# Patient Record
Sex: FEMALE | Race: WHITE | NOT HISPANIC OR LATINO | Employment: PART TIME | URBAN - METROPOLITAN AREA
[De-identification: names, ages, dates, MRNs, and addresses within clinical notes are randomized per-mention and may not be internally consistent; named-entity substitution may affect disease eponyms.]

---

## 2023-05-22 ENCOUNTER — HOSPITAL ENCOUNTER (OUTPATIENT)
Dept: DATA CONVERSION | Facility: HOSPITAL | Age: 33
End: 2023-05-22
Attending: INTERNAL MEDICINE | Admitting: INTERNAL MEDICINE
Payer: COMMERCIAL

## 2023-05-22 DIAGNOSIS — K44.9 DIAPHRAGMATIC HERNIA WITHOUT OBSTRUCTION OR GANGRENE: ICD-10-CM

## 2023-05-22 DIAGNOSIS — D50.9 IRON DEFICIENCY ANEMIA, UNSPECIFIED: ICD-10-CM

## 2023-05-22 DIAGNOSIS — N80.9 ENDOMETRIOSIS, UNSPECIFIED: ICD-10-CM

## 2023-05-22 DIAGNOSIS — F41.9 ANXIETY DISORDER, UNSPECIFIED: ICD-10-CM

## 2023-05-22 DIAGNOSIS — K63.89 OTHER SPECIFIED DISEASES OF INTESTINE: ICD-10-CM

## 2023-05-22 DIAGNOSIS — K64.8 OTHER HEMORRHOIDS: ICD-10-CM

## 2023-05-22 DIAGNOSIS — D12.2 BENIGN NEOPLASM OF ASCENDING COLON: ICD-10-CM

## 2023-05-22 DIAGNOSIS — K31.89 OTHER DISEASES OF STOMACH AND DUODENUM: ICD-10-CM

## 2023-05-22 DIAGNOSIS — K22.2 ESOPHAGEAL OBSTRUCTION: ICD-10-CM

## 2023-05-22 DIAGNOSIS — R19.5 OTHER FECAL ABNORMALITIES: ICD-10-CM

## 2023-05-22 DIAGNOSIS — K59.01 SLOW TRANSIT CONSTIPATION: ICD-10-CM

## 2023-05-31 LAB
COMPLETE PATHOLOGY REPORT: NORMAL
CONVERTED CLINICAL DIAGNOSIS-HISTORY: NORMAL
CONVERTED FINAL DIAGNOSIS: NORMAL
CONVERTED FINAL REPORT PDF LINK TO COPY AND PASTE: NORMAL
CONVERTED GROSS DESCRIPTION: NORMAL

## 2023-11-10 PROBLEM — F41.9 ANXIETY: Status: ACTIVE | Noted: 2023-11-10

## 2023-11-10 PROBLEM — M79.10 MYALGIA: Status: ACTIVE | Noted: 2023-11-10

## 2023-11-10 PROBLEM — Z86.59 HISTORY OF LAXATIVE ABUSE: Status: ACTIVE | Noted: 2023-11-10

## 2023-11-10 PROBLEM — H52.13 BILATERAL MYOPIA: Status: ACTIVE | Noted: 2023-11-10

## 2023-11-10 PROBLEM — N61.0 MASTITIS: Status: ACTIVE | Noted: 2023-11-10

## 2023-11-10 PROBLEM — B34.9 VIRAL SYNDROME: Status: ACTIVE | Noted: 2023-11-10

## 2023-11-10 PROBLEM — N63.20 LEFT BREAST MASS: Status: ACTIVE | Noted: 2023-11-10

## 2023-11-10 PROBLEM — K56.7 ILEUS (MULTI): Status: ACTIVE | Noted: 2023-11-10

## 2023-11-10 PROBLEM — J34.3 HYPERTROPHY OF BOTH INFERIOR NASAL TURBINATES: Status: ACTIVE | Noted: 2023-11-10

## 2023-11-10 PROBLEM — R10.9 ABDOMINAL PAIN: Status: ACTIVE | Noted: 2023-11-10

## 2023-11-10 PROBLEM — D50.9 ANEMIA, IRON DEFICIENCY: Status: ACTIVE | Noted: 2023-11-10

## 2023-11-10 PROBLEM — N80.9 ENDOMETRIOSIS: Status: ACTIVE | Noted: 2023-11-10

## 2023-11-10 PROBLEM — D17.1 LIPOMA OF TORSO: Status: ACTIVE | Noted: 2023-11-10

## 2023-11-10 PROBLEM — H92.09 OTALGIA: Status: ACTIVE | Noted: 2023-11-10

## 2023-11-10 PROBLEM — H66.92 LEFT OTITIS MEDIA: Status: ACTIVE | Noted: 2023-11-10

## 2023-11-10 PROBLEM — K62.5 RECTAL BLEEDING: Status: ACTIVE | Noted: 2023-11-10

## 2023-11-10 PROBLEM — A49.8: Status: ACTIVE | Noted: 2023-11-10

## 2023-11-10 PROBLEM — H69.93 DYSFUNCTION OF BOTH EUSTACHIAN TUBES: Status: ACTIVE | Noted: 2023-11-10

## 2023-11-10 PROBLEM — K56.609 BOWEL OBSTRUCTION (MULTI): Status: ACTIVE | Noted: 2023-11-10

## 2023-11-10 PROBLEM — O21.0 HYPEREMESIS GRAVIDARUM (HHS-HCC): Status: ACTIVE | Noted: 2023-11-10

## 2023-11-10 RX ORDER — ONDANSETRON 4 MG/1
TABLET, ORALLY DISINTEGRATING ORAL
COMMUNITY
End: 2023-11-14 | Stop reason: WASHOUT

## 2023-11-10 RX ORDER — DEXTROAMPHETAMINE SACCHARATE, AMPHETAMINE ASPARTATE, DEXTROAMPHETAMINE SULFATE AND AMPHETAMINE SULFATE 2.5; 2.5; 2.5; 2.5 MG/1; MG/1; MG/1; MG/1
1 TABLET ORAL 2 TIMES DAILY
COMMUNITY
Start: 2023-05-03 | End: 2023-11-14 | Stop reason: WASHOUT

## 2023-11-10 RX ORDER — DEXTROAMPHETAMINE SACCHARATE, AMPHETAMINE ASPARTATE, DEXTROAMPHETAMINE SULFATE AND AMPHETAMINE SULFATE 1.25; 1.25; 1.25; 1.25 MG/1; MG/1; MG/1; MG/1
2 TABLET ORAL 2 TIMES DAILY
COMMUNITY
Start: 2023-03-07 | End: 2023-11-14 | Stop reason: WASHOUT

## 2023-11-10 RX ORDER — LINACLOTIDE 290 UG/1
290 CAPSULE, GELATIN COATED ORAL
COMMUNITY
Start: 2023-05-31

## 2023-11-10 RX ORDER — LEVONORGESTREL 52 MG/1
1 INTRAUTERINE DEVICE INTRAUTERINE
COMMUNITY
Start: 2023-05-17

## 2023-11-10 RX ORDER — DEXTROAMPHETAMINE SULFATE, DEXTROAMPHETAMINE SACCHARATE, AMPHETAMINE SULFATE AND AMPHETAMINE ASPARTATE 2.5; 2.5; 2.5; 2.5 MG/1; MG/1; MG/1; MG/1
CAPSULE, EXTENDED RELEASE ORAL
COMMUNITY
Start: 2022-12-18 | End: 2023-11-14 | Stop reason: WASHOUT

## 2023-11-10 RX ORDER — SERTRALINE HYDROCHLORIDE 25 MG/1
TABLET, FILM COATED ORAL
COMMUNITY

## 2023-11-10 RX ORDER — FOLIC ACID 1 MG/1
1 TABLET ORAL DAILY
COMMUNITY
Start: 2023-05-11

## 2023-11-10 RX ORDER — BUPROPION HYDROCHLORIDE 150 MG/1
TABLET ORAL
COMMUNITY
Start: 2023-09-25

## 2023-11-10 RX ORDER — AZELASTINE 1 MG/ML
1 SPRAY, METERED NASAL 2 TIMES DAILY
COMMUNITY
Start: 2022-12-19 | End: 2023-11-14 | Stop reason: WASHOUT

## 2023-11-10 RX ORDER — SERTRALINE HYDROCHLORIDE 50 MG/1
50 TABLET, FILM COATED ORAL DAILY
COMMUNITY
Start: 2023-05-18 | End: 2023-11-14 | Stop reason: WASHOUT

## 2023-11-10 RX ORDER — DEXTROAMPHETAMINE SACCHARATE, AMPHETAMINE ASPARTATE MONOHYDRATE, DEXTROAMPHETAMINE SULFATE AND AMPHETAMINE SULFATE 5; 5; 5; 5 MG/1; MG/1; MG/1; MG/1
20 CAPSULE, EXTENDED RELEASE ORAL DAILY
COMMUNITY
Start: 2023-05-11

## 2023-11-10 RX ORDER — ESCITALOPRAM OXALATE 10 MG/1
TABLET ORAL
COMMUNITY
End: 2023-11-14 | Stop reason: WASHOUT

## 2023-11-10 RX ORDER — SENNOSIDES 8.6 MG
2 TABLET ORAL NIGHTLY
COMMUNITY
Start: 2023-03-11

## 2023-11-10 RX ORDER — NORETHINDRONE 5 MG/1
5 TABLET ORAL DAILY
COMMUNITY
Start: 2023-04-25

## 2023-11-10 RX ORDER — POLYETHYLENE GLYCOL 3350 17 G/17G
POWDER, FOR SOLUTION ORAL
COMMUNITY
Start: 2023-03-11

## 2023-11-10 RX ORDER — LINACLOTIDE 145 UG/1
145 CAPSULE, GELATIN COATED ORAL DAILY
COMMUNITY
Start: 2023-03-11 | End: 2023-11-14 | Stop reason: WASHOUT

## 2023-11-13 ENCOUNTER — OFFICE VISIT (OUTPATIENT)
Dept: OTOLARYNGOLOGY | Facility: CLINIC | Age: 33
End: 2023-11-13
Payer: COMMERCIAL

## 2023-11-13 VITALS
SYSTOLIC BLOOD PRESSURE: 118 MMHG | BODY MASS INDEX: 18.05 KG/M2 | TEMPERATURE: 97.9 F | WEIGHT: 115 LBS | DIASTOLIC BLOOD PRESSURE: 79 MMHG | HEART RATE: 88 BPM | HEIGHT: 67 IN

## 2023-11-13 DIAGNOSIS — J32.9 CHRONIC RHINOSINUSITIS: Primary | ICD-10-CM

## 2023-11-13 DIAGNOSIS — J31.2 CHRONIC PHARYNGITIS: ICD-10-CM

## 2023-11-13 PROCEDURE — 99203 OFFICE O/P NEW LOW 30 MIN: CPT

## 2023-11-13 PROCEDURE — 1036F TOBACCO NON-USER: CPT

## 2023-11-13 PROCEDURE — 31231 NASAL ENDOSCOPY DX: CPT

## 2023-11-13 RX ORDER — DOXYCYCLINE 100 MG/1
100 CAPSULE ORAL 2 TIMES DAILY
Qty: 42 CAPSULE | Refills: 0 | Status: SHIPPED | OUTPATIENT
Start: 2023-11-13 | End: 2023-12-04

## 2023-11-13 NOTE — PROGRESS NOTES
DIAGNOSES/PROBLEMS:  -Chronic rhinosinusitis without nasal polyposis  -Chronic pharyngitis  PROVIDER IMPRESSIONS:  ASSESSMENT: Pat Dorado  is a pleasant 33 y.o. female who presents with symptoms of  symptoms of chronic nasal congestion and throat discomfort and clinical findings consistent with chronic rhinosinusitis without nasal polyposis. Nasal endoscopy today revealed evidence of purulent drainage located in the left middle meatus and/or sphenoethmoid recess. Patient reassured that exam findings did not show polyps, masses or lesions. I discussed with the patient that given her history of recurrent sinus infections, further investigation with imaging of the sinuses is necessary to determine etiology. I also explained that we will provide the patient with maximum medical therapy for the sinuses prior to sinonasal imaging.     PLAN:  We will start the patient on antibiotics to complete maximal medical therapy. Rx for doxycyline 100 mg p.o. twice daily for 21 days. The patient was advised to take sun precautions while on medication and to notify us if they experience abdominal bloating and diarrhea, and we will change medication.  I recommended Medrol Dosepak for inflammation of the nasopharynx. Prescription e-submitted to pharmacy on 11/16/2023.   After completing a course of oral antibiotics, please obtain a CT scan for the sinuses. Requisition order provided to the patient.  I recommended initiating saline nasal irrigation once a day prior to application of intranasal steroid. Patient was given a demonstration on proper application and provided instructional handout.   I recommended continuation of intranasal steroid nasal spray to help improve nasal symptoms with Flonase 1 spray each nostril twice daily. Patient was also instructed on the appropriate use of the medication, by pointing the applicator tip towards the lateral wall of the nose/corner of the eye on the same side. Patient was instructed to avoid  the septum due to the risk of nasal bleeding. Reinforced education to clean applicator after every use.   Follow-up: Patient may schedule virtual follow up visit with me after CT imaging of the sinus to review the results. Patient is amendable to plan. All questions answered to patient satisfaction.     Subjective   Patient ID Pat Dorado is a 33 y.o. female who presents for initial evaluation of recurrent sinus infections.     History of Present Illness  Mariluz is a 33 year old female who presents for subsequent evaluation of recurrent sinus infections.The patient recalls initial onset approximately 6 years ago (since her last pregnancy), but endorses that sinus infections have recurred more frequently over the past 3 years. She states that over the past 12 months, she has been treated for approximately 10 sinus infections, with approximately 4-6 infections treated in the past 4 months. Sinus infections typically associated with a low-grade fever (Tmax reported to be 101 F), ear popping, ear pain and tinnitus. When asked about major sinonasal symptoms, including anterior nasal drainage, posterior nasal drainage, nasal airway obstruction, facial pain (right>left), facial pressure, and decreased or changed sense of smell,the patient admits to experiencing facial pain, facial pressure, and posterior nasal drainage. When asked about associated symptoms, including headache, throat clearing, coughing, throat discomfort, hoarseness, sneezing, itching eyes, and nasal bleeding, she admits to experiencing throat clearing and throat discomfort. On 10/24/2023 she was treated for a sinus infection with a 10 day course of p.o. Augmentin twice daily. On 11/6/2023, she returned clinic due to ongoing symptoms and was treated with clindamycin 300 mg TID for 7 days and a Medrol Dosepak.  When asked about medications used for sinonasal symptoms, the patient reports current use of Flonase intranasal spray and zyrtec daily. Denies past  medical history of asthma, aspirin sensitivity, migraines, or nasal fracture. Denies history of sinonasal imaging or surgery.       Past Medical History:   Diagnosis Date    Anxiety disorder, unspecified     Anxiety    Infection of nipple associated with the puerperium 2019    Yeast infection of nipple, postpartum    Nonpurulent mastitis associated with the puerperium 2019    Mastitis, postpartum    Personal history of other complications of pregnancy, childbirth and the puerperium 2019    History of threatened     Personal history of other diseases of the female genital tract 2017    History of endometritis    Personal history of other mental and behavioral disorders 2017    History of depression      Past Surgical History:   Procedure Laterality Date    LAPAROSCOPY DIAGNOSTIC / BIOPSY / ASPIRATION / LYSIS  2017    Laparoscopy (Diagnostic)    OTHER SURGICAL HISTORY  2015    Appendiceal Laparoscopy      Allergies   Allergen Reactions    Cefaclor Angioedema, Other and Swelling        Current Outpatient Medications:     amphetamine-dextroamphetamine XR (Adderall XR) 20 mg 24 hr capsule, Take 1 capsule (20 mg) by mouth once daily., Disp: , Rfl:     buPROPion XL (Wellbutrin XL) 150 mg 24 hr tablet, Take 1 tablet by mouth every afternoon., Disp: , Rfl:     folic acid (Folvite) 1 mg tablet, Take 1 tablet (1 mg) by mouth once daily., Disp: , Rfl:     levonorgestrel (Mirena) 21 mcg/24 hours (8 yrs) 52 mg IUD, 52 mg by intrauterine route. one time only for 1 dose., Disp: , Rfl:     Linzess 290 mcg capsule, Take 1 capsule (290 mcg) by mouth once daily., Disp: , Rfl:     norethindrone (Aygestin) 5 mg tablet, Take 1 tablet (5 mg) by mouth once daily., Disp: , Rfl:     polyethylene glycol (Glycolax, Miralax) 17 gram/dose powder, MIX 17 GRAMS IN LIQUID AND DRINK TWICE DAILY, Disp: , Rfl:     senna 8.6 mg tablet, Take 2 tablets (17.2 mg) by mouth once daily at bedtime., Disp: ,  "Rfl:     sertraline (Zoloft) 25 mg tablet,  , Disp: , Rfl:     Adderall XR 10 mg 24 hr capsule, TAKE 2 CAPSULES BY MOUTH EVERY MORNING, Disp: , Rfl:     amphetamine-dextroamphetamine (Adderall) 10 mg tablet, Take 1 tablet (10 mg) by mouth 2 times a day., Disp: , Rfl:     amphetamine-dextroamphetamine (Adderall) 5 mg tablet, Take 2 tablets (10 mg) by mouth 2 times a day., Disp: , Rfl:     azelastine (Astelin) 137 mcg (0.1 %) nasal spray, Administer 1 spray into each nostril 2 times a day., Disp: , Rfl:     escitalopram (Lexapro) 10 mg tablet,  , Disp: , Rfl:     Linzess 145 mcg capsule, Take 1 capsule (145 mcg) by mouth once daily., Disp: , Rfl:     ondansetron ODT (Zofran-ODT) 4 mg disintegrating tablet, DISSOLVE 1 TABLET ON THE TONGUE EVERY 8 HOURS FOR UP TO 3 DAYS AS NEEDED FOR NAUSEA OR VOMITING, Disp: , Rfl:     sertraline (Zoloft) 50 mg tablet, Take 1 tablet (50 mg) by mouth once daily., Disp: , Rfl:    Tobacco Use: Low Risk  (11/13/2023)    Patient History     Smoking Tobacco Use: Never     Smokeless Tobacco Use: Never     Passive Exposure: Never      Alcohol Use: Not on file      Social History     Substance and Sexual Activity   Drug Use Not on file        Review of Systems   All other systems negative.     Objective   Visit Vitals  /79 (BP Location: Right arm, Patient Position: Sitting, BP Cuff Size: Adult)   Pulse 88   Temp 36.6 °C (97.9 °F)   Ht 1.702 m (5' 7\")   Wt 52.2 kg (115 lb)   BMI 18.01 kg/m²   Smoking Status Never   BSA 1.57 m²        Physical Exam  General appearance: Appears well, well-nourished, well groomed. No acute distress.   Constitutional: No fever, chills, weight loss or weight gain.  Communication: Normal communication  Psychiatric: Oriented to person, place and time. Normal mood and affect.  Neurologic: Cranial nerves II-XII grossly intact and symmetric bilaterally.  Cardiovascular: Examination of peripheral vascular system shows no clubbing or cyanosis.  Respiratory: No " respiratory distress increased work of breathing. Inspection of the chest with symmetric chest expansion and normal respiratory effort.  Skin: No head and neck rashes.  Head: Normocephalic. Atraumatic with no masses, lesions or scarring.  Face: Normal symmetry. No scars or deformities.  Eyes: Conjunctiva not edematous or erythematous. PERRLA  Neck: Supple and symmetric, trachea midline. Lymph nodes with no adenopathy.  Head: Normocephalic. Atraumatic with no masses, lesions or scarring.  Eyes: PERRL, EOMI, Conjunctiva is clear. No nystagmus.  Nose: External inspection of nose: No nasal lesions, lacerations or scars. Mild tenderness on frontal or maxillary sinus palpation.  Throat:  Floor of mouth is clear, no masses.  Tongue appears normal, no lesions or masses. Gums, gingiva, buccal mucosa appear pink and moist, no lesions. Teeth are intact .  No obvious dental infections.  Peritonsillar regions appear symmetric without swelling.  Hard and soft palate appear normal, no obvious cleft. Uvula is midline.  Left Tonsil -- 2+, no exudates.  Right Tonsil -- 2+, no exudates.  Oropharynx: No lesions. Retropharyngeal wall appears erythematous and with cobblestone appearance with postnasal drip.  Salivary Glands: Symmetric bilaterally.  No palpable masses.  No evidence of acute infection or salivary stones.  Neck: Supple,  no lymphadenopathy.  No masses.  Right Ear: External inspection of ear with no deformity, scars, or masses. Mastoid is nontender. External auditory canal is clear. TM is intact with no sign of infection, effusion, or retraction.  No perforation seen. Auto insufflation visible under microscopy.  Left Ear: External inspection of ear with no deformity, scars, or masses. Mastoid is nontender. External auditory canal clear.  Tympanic membrane is intact with no sign of infection, effusion, or retraction.  No perforation seen. Auto insufflation visible under microscopy.    Procedures   NASAL ENDOSCOPY PROCEDURE  NOTE  INDICATION: concern for rhinosinusitis  For better visualization, rigid nasal endoscopy was performed.  Verbal consent was obtained by the patient and/or guardian. Both nostrils were sprayed with a mixture of lidocaine 4% and topical nasal decongestant. After a sufficient amount of time elapsed for mucosal anesthesia to take place, the nasal endoscope was advanced into the nostril.     The following areas were visualized:  Nasal passage, nasal septum, turbinates, middle meatus, nasopharynx, sinus ostia     The patient tolerated the procedure well and these structures were found to be normal except as follows:  Septum midline, retropharyngeal wall with cobblestone appearance and erythematous. There is evidence of purulent drainage located in the left middle meatus and/or sphenoethmoid recess. No evidence of nasal polyps, lesions, or masses. ET orifices patent.     Kenisha Myrick, APRN-CNP

## 2023-11-14 NOTE — PATIENT INSTRUCTIONS
ANTIBIOTIC PRESCRIPTION INSTRUCTIONS: DOXYCYCLINE  You will be prescribed an antibiotic called Doxycycline, taken by mouth twice daily, for 3 full weeks.  Special instructions: Please take food with oral antibiotic (doxycycline) use. Do not take medication within 2 hours of consuming dairy products, as this may interact with the medication’s effectiveness. You may take probiotics while on antibiotics: you may use yogurt as probiotic if consumed 2 hours before or after taking doxycycline.  Adverse drug reactions: Doxycycline can cause a skin sensitivity reaction with the sun. Avoid sun exposure while taking this medication. Severe sunburn and skin damage may occur with sun exposure if no UV protection is used while on this medication. Discontinue antibiotic use if you experience abdominal pain, bloating and severe diarrhea. Call 911 or proceed to nearest Emergency Department if you experience hives and/or shortness of breath occur while taking doxycycline. Call the office and new medication will be prescribed.  It is essential for you to complete the full course of antibiotics for maximum effectiveness and decreasing risk of pathogen resistance. Even if you start to feel symptom improvement, please continue to take antibiotic entirely unless you are experiencing any adverse drug reactions.  Follow up with radiologic test such as CT or MRI if these were ordered.

## 2023-11-16 RX ORDER — METHYLPREDNISOLONE 4 MG/1
4 TABLET ORAL DAILY
Qty: 7 TABLET | Refills: 0 | Status: SHIPPED | OUTPATIENT
Start: 2023-11-16 | End: 2023-11-23

## 2023-12-04 ENCOUNTER — APPOINTMENT (OUTPATIENT)
Dept: RADIOLOGY | Facility: CLINIC | Age: 33
End: 2023-12-04
Payer: COMMERCIAL

## 2023-12-05 ENCOUNTER — TELEPHONE (OUTPATIENT)
Dept: OTOLARYNGOLOGY | Facility: CLINIC | Age: 33
End: 2023-12-05
Payer: COMMERCIAL

## 2023-12-05 NOTE — TELEPHONE ENCOUNTER
"Relayed below message from Bel Johnston NP     \"I just looked through her chart. She has had an extensive amount of antibiotic therapy and I really don't advise any further. She just completed 21 days of doxy from Jessica. Prior to that had 7 days of clinda and prior to that augmentin x 10 days.     Im happy to provide her with diflucan but she should continue supportive measures with saline irrigations and continue monitoring her temperature. She should be evaluated in urgent care for anything over 102. You can ask Jessica next week to take an early look at the CT if she calls back because it is persisting.     With that much oral antibiotic therapy, there is really no indication to continue, its clearly not working.     Please call her and walk her through saline irrigations. And ask if she wants the diflucan and confirm pharmacy. But I would hold off on further antibiotics. \"     Patient stated she has started taking some antibiotics ordered from her daughter and she is starting to feel better- instructed her on risks of taking long term antibiotic use. Patient will wait for the CT Scan to be read by Jessica SHARMA on Monday.         Spoke to patient to make her aware that Jessica SHARMA is out of office this week but her message was sent over to her covering colleagues. Patient confirmed she has been doing sinus rinses as instructed, states she has not taken her temperature with a thermometer but if she had to guess it would be 101 degree F this morning. Patient made aware someone from my office will contact her once I hear back from the covering NP's. Patient agreeable with plan of care.     ----- Message from Pat Dorado sent at 12/5/2023  7:50 AM EST -----  Regarding: Symptom Return post Abx.  Contact: [phone]  Hello,    I finished my doxycycline, last dose being Sunday night.  I am experiencing bad sinus symptoms again.  Horrible head aches, facial pain, choking on post nasal drip at night, ear popping " sensation, fever.  Is there any way I can get a longer course of antibiotics or stronger antibiotics?  I also think I have a yeast infection from the antibiotics.  Is there any way I could also get a prescription for diflucan sent in?  I’m just miserable again.    Thank you,  Pat Dorado   [phone]

## 2023-12-06 ENCOUNTER — DOCUMENTATION (OUTPATIENT)
Dept: OTOLARYNGOLOGY | Facility: CLINIC | Age: 33
End: 2023-12-06
Payer: COMMERCIAL

## 2023-12-06 DIAGNOSIS — T36.95XA ANTIBIOTIC-INDUCED YEAST INFECTION: Primary | ICD-10-CM

## 2023-12-06 DIAGNOSIS — B37.9 ANTIBIOTIC-INDUCED YEAST INFECTION: Primary | ICD-10-CM

## 2023-12-06 RX ORDER — FLUCONAZOLE 100 MG/1
TABLET ORAL
Qty: 14 TABLET | Refills: 0 | Status: SHIPPED | OUTPATIENT
Start: 2023-12-06

## 2023-12-06 NOTE — PROGRESS NOTES
Patient messaged via [employer] with complaints of a yeast infection following antibiotic therapy. Requesting diflucan. Covering Kenisha Myrick CNP. Will send Diflucan 100mg, may repeat in 3 days if needed to pharmacy.

## 2023-12-07 ENCOUNTER — APPOINTMENT (OUTPATIENT)
Dept: RADIOLOGY | Facility: CLINIC | Age: 33
End: 2023-12-07
Payer: COMMERCIAL

## 2023-12-07 ENCOUNTER — TELEPHONE (OUTPATIENT)
Dept: OTOLARYNGOLOGY | Facility: CLINIC | Age: 33
End: 2023-12-07
Payer: COMMERCIAL

## 2023-12-07 NOTE — TELEPHONE ENCOUNTER
Spoke to patient on the telephone in regards to this message; relayed patient message to NP covering Jessica Staples. Patient was made aware no additional antibiotics will be prescribed at this time after already been prescribed a long course prior to this. CT Sinus scan was rescheduled for 12/12/23 and patient highly encouraged to complete this as this will be helpful in assessing her sinuses. She was advised if vomiting and diarrhea persist she should go to the nearest urgent care for evaluation. Patient verbalized understanding.     ----- Message from Pat Dorado sent at 12/7/2023  9:10 AM EST -----  Regarding: Symptom Return post Abx.  Contact: [phone]  Hello,    I have a sinus ct scheduled this morning, however I am too sick to even go to it.  I will need to re schedule.  I know the provider was hesitant to prescribe an antibiotic. However, I’m begging at this point even for just a few days until Jessica returns?  My head ache is unbearable, I have nausea and vomiting from the pressure.  Is there any chance I could get a few more days of doxycycline and maybe a few zofran to get me through the weekend?  I have been doing 3 sinus rinses a day.    Thank you,   Pat Dorado   [phone]

## 2023-12-12 ENCOUNTER — ANCILLARY PROCEDURE (OUTPATIENT)
Dept: RADIOLOGY | Facility: CLINIC | Age: 33
End: 2023-12-12
Payer: COMMERCIAL

## 2023-12-12 ENCOUNTER — TELEMEDICINE (OUTPATIENT)
Dept: OTOLARYNGOLOGY | Facility: CLINIC | Age: 33
End: 2023-12-12
Payer: COMMERCIAL

## 2023-12-12 DIAGNOSIS — J34.2 DEVIATED NASAL SEPTUM: ICD-10-CM

## 2023-12-12 DIAGNOSIS — J32.9 CHRONIC RHINOSINUSITIS: ICD-10-CM

## 2023-12-12 DIAGNOSIS — J32.0 CHRONIC MAXILLARY SINUSITIS: Primary | ICD-10-CM

## 2023-12-12 PROCEDURE — 70486 CT MAXILLOFACIAL W/O DYE: CPT

## 2023-12-12 PROCEDURE — 99212 OFFICE O/P EST SF 10 MIN: CPT

## 2023-12-12 NOTE — PROGRESS NOTES
DIAGNOSES/PROBLEMS:  -Chronic maxillary sinusitis   -Deviated nasal septum   PROVIDER IMPRESSIONS:  ASSESSMENT: Pat Dorado  is a pleasant 33 y.o. female who presents for virtual follow up to review sinus CT results. I personally reviewed the imaging results with the patient, with findings of narrowing of the osteomeatal unit bilaterally, which may be obstructing her ability to drain intranasal saline irrigations if she is performing them every 2-4 hours. Reassurance provided to the patient that there is no evidence of masses, lesions, or severe inflammatory changes in her sinuses bilaterally. I also explained to the patient that risks outweigh the benefits of prescribing further p.o. antibiotics at this point, given her recent 10 day course of p.o. Augmentin (10/24/23), 7 day course of p.o. clindamycin (11/6/23), and 21 day course of p.o. doxycyline (11/13/2023). I do not recommend further oral antibiotic therapy at this time.     PLAN:  I recommended referral to one of my rhinology physician colleagues for further evaluation and management of chronic/recurrent maxillary sinusitis and surgical consideration.   I recommended reduction in frequency of intranasal saline irrigations from every 2-4 hours down to once or twice daily.   I recommended continued use of intranasal flonase 1 spray each nostril twice daily. I also recommended continued use of daily zyrtec.   Follow-up: Patient may follow up with Dr. NARCISO Mohan for further evaluation and management. I have contacted Alisa Alfonso RN to coordinate follow up appointment. All questions answered to patient satisfaction.     At today's visit with Pat Dorado , the number of minutes I spent providing patient care was 13.  More than 50% of that time was spent counseling the patient on possible etiologies, test results, treatment options and care coordination.      Subjective   Patient ID Pat Dorado is a 33 y.o. female who presents for virtual follow up  evaluation for   History of Present Illness  Pat Dorado is a 33 year old female who presents for virtual follow up evaluation to discuss CT sinus results. Patient reports that she completed medrol dosepak and also that 36 hours after completion of the 3 week course of p.o. doxycycline prescribed at last visit (11/13/2023), she had symptom recurrence of facial pain/pressure and posterior nasal drainage. She reports using an old prescription of her daughter's for p.o. Cefdinir after symptom recurrence to try and relieve her sinonasal symptoms. Associated symptoms include headache and throat discomfort. She reports that she has been using intranasal saline irrigation bottle every 2-4 hours due to prevent sinonasal symptoms from becoming too severe. She continues to use Flonase twice daily, takes zyrtec daily, and uses tylenol/motrin frequently for sinus and head pain. Mentions that she had to cancel her initial CT scheduled due to the stomach flu.     RECALL 11/13/2023:   DIAGNOSES/PROBLEMS:  -Chronic rhinosinusitis without nasal polyposis  -Chronic pharyngitis  PROVIDER IMPRESSIONS:  ASSESSMENT: Pat Dorado  is a pleasant 33 y.o. female who presents with symptoms of  symptoms of chronic nasal congestion and throat discomfort and clinical findings consistent with chronic rhinosinusitis without nasal polyposis. Nasal endoscopy today revealed evidence of purulent drainage located in the left middle meatus and/or sphenoethmoid recess. Patient reassured that exam findings did not show polyps, masses or lesions. I discussed with the patient that given her history of recurrent sinus infections, further investigation with imaging of the sinuses is necessary to determine etiology. I also explained that we will provide the patient with maximum medical therapy for the sinuses prior to sinonasal imaging.   PLAN:  We will start the patient on antibiotics to complete maximal medical therapy. Rx for doxycyline 100 mg p.o. twice  daily for 21 days. The patient was advised to take sun precautions while on medication and to notify us if they experience abdominal bloating and diarrhea, and we will change medication.  I recommended Medrol Dosepak for inflammation of the nasopharynx. Prescription e-submitted to pharmacy on 11/16/2023.   After completing a course of oral antibiotics, please obtain a CT scan for the sinuses. Requisition order provided to the patient.  I recommended initiating saline nasal irrigation once a day prior to application of intranasal steroid. Patient was given a demonstration on proper application and provided instructional handout.   I recommended continuation of intranasal steroid nasal spray to help improve nasal symptoms with Flonase 1 spray each nostril twice daily. Patient was also instructed on the appropriate use of the medication, by pointing the applicator tip towards the lateral wall of the nose/corner of the eye on the same side. Patient was instructed to avoid the septum due to the risk of nasal bleeding. Reinforced education to clean applicator after every use.   Follow-up: Patient may schedule virtual follow up visit with me after CT imaging of the sinus to review the results. Patient is amendable to plan. All questions answered to patient satisfaction.   Subjective   Patient ID Pat Dorado is a 33 y.o. female who presents for initial evaluation of recurrent sinus infections.   History of Present Illness  Mariluz is a 33 year old female who presents for subsequent evaluation of recurrent sinus infections.The patient recalls initial onset approximately 6 years ago (since her last pregnancy), but endorses that sinus infections have recurred more frequently over the past 3 years. She states that over the past 12 months, she has been treated for approximately 10 sinus infections, with approximately 4-6 infections treated in the past 4 months. Sinus infections typically associated with a low-grade fever (Tmax  reported to be 101 F), ear popping, ear pain and tinnitus. When asked about major sinonasal symptoms, including anterior nasal drainage, posterior nasal drainage, nasal airway obstruction, facial pain (right>left), facial pressure, and decreased or changed sense of smell,the patient admits to experiencing facial pain, facial pressure, and posterior nasal drainage. When asked about associated symptoms, including headache, throat clearing, coughing, throat discomfort, hoarseness, sneezing, itching eyes, and nasal bleeding, she admits to experiencing throat clearing and throat discomfort. On 10/24/2023 she was treated for a sinus infection with a 10 day course of p.o. Augmentin twice daily. On 2023, she returned clinic due to ongoing symptoms and was treated with clindamycin 300 mg TID for 7 days and a Medrol Dosepak.  When asked about medications used for sinonasal symptoms, the patient reports current use of Flonase intranasal spray and zyrtec daily. Denies past medical history of asthma, aspirin sensitivity, migraines, or nasal fracture. Denies history of sinonasal imaging or surgery.              Past Medical History:   Diagnosis Date    Anxiety disorder, unspecified     Anxiety    Infection of nipple associated with the puerperium 2019    Yeast infection of nipple, postpartum    Nonpurulent mastitis associated with the puerperium 2019    Mastitis, postpartum    Personal history of other complications of pregnancy, childbirth and the puerperium 2019    History of threatened     Personal history of other diseases of the female genital tract 2017    History of endometritis    Personal history of other mental and behavioral disorders 2017    History of depression      Past Surgical History:   Procedure Laterality Date    LAPAROSCOPY DIAGNOSTIC / BIOPSY / ASPIRATION / LYSIS  2017    Laparoscopy (Diagnostic)    OTHER SURGICAL HISTORY  2015    Appendiceal Laparoscopy       Allergies   Allergen Reactions    Cefaclor Angioedema, Other and Swelling        Current Outpatient Medications:     amphetamine-dextroamphetamine XR (Adderall XR) 20 mg 24 hr capsule, Take 1 capsule (20 mg) by mouth once daily., Disp: , Rfl:     buPROPion XL (Wellbutrin XL) 150 mg 24 hr tablet, Take 1 tablet by mouth every afternoon., Disp: , Rfl:     fluconazole (Diflucan) 100 mg tablet, TAKE 1 TAB ASAP, MAY REPEAT IN 3 DAYS IF NEEDED, Disp: 14 tablet, Rfl: 0    folic acid (Folvite) 1 mg tablet, Take 1 tablet (1 mg) by mouth once daily., Disp: , Rfl:     levonorgestrel (Mirena) 21 mcg/24 hours (8 yrs) 52 mg IUD, 52 mg by intrauterine route. one time only for 1 dose., Disp: , Rfl:     Linzess 290 mcg capsule, Take 1 capsule (290 mcg) by mouth once daily., Disp: , Rfl:     norethindrone (Aygestin) 5 mg tablet, Take 1 tablet (5 mg) by mouth once daily., Disp: , Rfl:     polyethylene glycol (Glycolax, Miralax) 17 gram/dose powder, MIX 17 GRAMS IN LIQUID AND DRINK TWICE DAILY, Disp: , Rfl:     senna 8.6 mg tablet, Take 2 tablets (17.2 mg) by mouth once daily at bedtime., Disp: , Rfl:     sertraline (Zoloft) 25 mg tablet,  , Disp: , Rfl:    Tobacco Use: Low Risk  (12/6/2023)    Patient History     Smoking Tobacco Use: Never     Smokeless Tobacco Use: Never     Passive Exposure: Never      Alcohol Use: Not on file      Social History     Substance and Sexual Activity   Drug Use Not on file        Review of Systems   All other systems negative.     Objective   Visit Vitals  LMP  (LMP Unknown)   OB Status Having periods   Smoking Status Never        Physical Exam  CONSTITUTIONAL: No acute distress, normal facial features; No fever; no chills  VOICE: No hoarseness or other audible abnormality  RESPIRATION: Breathing comfortably, no stridor; normal breathing effort  CV: No cyanosis visible on the face and neck area  EYES: Pupils equal and round; no erythema; conjunctiva clear; sclera white  NEURO: Alert and oriented,  able to raise eyebrows symmetrical bilateral, smile with no facial droop, able to swallow  HEAD AND FACE: Symmetric facial features, no masses or lesions Right ear examination: External ear normal. Left ear examination: External ear normal.  NOSE: External nose midline  ORAL CAVITY: No lesions of external lips; uvula is midline; tongue with good mobility; no gross mass in oral cavity; mucosa appears pink  NECK/LYMPH: No obvious deformity or lesions; trachea is midline   PSYCH: Alert and oriented with appropriate mood and affect      Results   I personally reviewed the results of the CT sinus wo contrast volumetric surgical planning from 12/12/2023 with the following summary of findings: Soft tissue narrowing of the left-sided maxillary sinus infundibulum. Bilateral maxillary sinus air-fluid levels. Nasal fossa slight deviation of the nasal septum to the right with 3mm bony spur.       Kenisha Myrick, APRN-CNP

## 2023-12-13 ENCOUNTER — HOSPITAL ENCOUNTER (EMERGENCY)
Facility: HOSPITAL | Age: 33
Discharge: HOME | End: 2023-12-13
Payer: COMMERCIAL

## 2023-12-13 VITALS
OXYGEN SATURATION: 97 % | WEIGHT: 112 LBS | TEMPERATURE: 97 F | HEIGHT: 67 IN | SYSTOLIC BLOOD PRESSURE: 126 MMHG | BODY MASS INDEX: 17.58 KG/M2 | RESPIRATION RATE: 18 BRPM | DIASTOLIC BLOOD PRESSURE: 86 MMHG | HEART RATE: 97 BPM

## 2023-12-13 DIAGNOSIS — J01.90 SUBACUTE SINUSITIS, UNSPECIFIED LOCATION: Primary | ICD-10-CM

## 2023-12-13 LAB
ALBUMIN SERPL BCP-MCNC: 4 G/DL (ref 3.4–5)
ALP SERPL-CCNC: 52 U/L (ref 33–110)
ALT SERPL W P-5'-P-CCNC: 15 U/L (ref 7–45)
ANION GAP SERPL CALC-SCNC: 12 MMOL/L (ref 10–20)
AST SERPL W P-5'-P-CCNC: 15 U/L (ref 9–39)
BASOPHILS # BLD AUTO: 0.03 X10*3/UL (ref 0–0.1)
BASOPHILS NFR BLD AUTO: 0.4 %
BILIRUB SERPL-MCNC: 0.5 MG/DL (ref 0–1.2)
BUN SERPL-MCNC: 8 MG/DL (ref 6–23)
CALCIUM SERPL-MCNC: 9.1 MG/DL (ref 8.6–10.6)
CHLORIDE SERPL-SCNC: 105 MMOL/L (ref 98–107)
CO2 SERPL-SCNC: 26 MMOL/L (ref 21–32)
CREAT SERPL-MCNC: 0.54 MG/DL (ref 0.5–1.05)
EOSINOPHIL # BLD AUTO: 0.05 X10*3/UL (ref 0–0.7)
EOSINOPHIL NFR BLD AUTO: 0.7 %
ERYTHROCYTE [DISTWIDTH] IN BLOOD BY AUTOMATED COUNT: 11.1 % (ref 11.5–14.5)
FLUAV RNA RESP QL NAA+PROBE: NOT DETECTED
FLUBV RNA RESP QL NAA+PROBE: NOT DETECTED
GFR SERPL CREATININE-BSD FRML MDRD: >90 ML/MIN/1.73M*2
GLUCOSE SERPL-MCNC: 95 MG/DL (ref 74–99)
HCT VFR BLD AUTO: 36.9 % (ref 36–46)
HGB BLD-MCNC: 13.1 G/DL (ref 12–16)
IMM GRANULOCYTES # BLD AUTO: 0.01 X10*3/UL (ref 0–0.7)
IMM GRANULOCYTES NFR BLD AUTO: 0.1 % (ref 0–0.9)
LYMPHOCYTES # BLD AUTO: 1.5 X10*3/UL (ref 1.2–4.8)
LYMPHOCYTES NFR BLD AUTO: 20.9 %
MAGNESIUM SERPL-MCNC: 1.85 MG/DL (ref 1.6–2.4)
MCH RBC QN AUTO: 32.3 PG (ref 26–34)
MCHC RBC AUTO-ENTMCNC: 35.5 G/DL (ref 32–36)
MCV RBC AUTO: 91 FL (ref 80–100)
MONOCYTES # BLD AUTO: 0.46 X10*3/UL (ref 0.1–1)
MONOCYTES NFR BLD AUTO: 6.4 %
NEUTROPHILS # BLD AUTO: 5.13 X10*3/UL (ref 1.2–7.7)
NEUTROPHILS NFR BLD AUTO: 71.5 %
NRBC BLD-RTO: 0 /100 WBCS (ref 0–0)
PLATELET # BLD AUTO: 249 X10*3/UL (ref 150–450)
POTASSIUM SERPL-SCNC: 3.3 MMOL/L (ref 3.5–5.3)
PROT SERPL-MCNC: 6.9 G/DL (ref 6.4–8.2)
RBC # BLD AUTO: 4.05 X10*6/UL (ref 4–5.2)
RSV RNA RESP QL NAA+PROBE: NOT DETECTED
SARS-COV-2 RNA RESP QL NAA+PROBE: NOT DETECTED
SODIUM SERPL-SCNC: 140 MMOL/L (ref 136–145)
TSH SERPL-ACNC: 0.83 MIU/L (ref 0.44–3.98)
WBC # BLD AUTO: 7.2 X10*3/UL (ref 4.4–11.3)

## 2023-12-13 PROCEDURE — 85025 COMPLETE CBC W/AUTO DIFF WBC: CPT | Performed by: PHYSICIAN ASSISTANT

## 2023-12-13 PROCEDURE — 84443 ASSAY THYROID STIM HORMONE: CPT | Performed by: PHYSICIAN ASSISTANT

## 2023-12-13 PROCEDURE — 99283 EMERGENCY DEPT VISIT LOW MDM: CPT

## 2023-12-13 PROCEDURE — 87637 SARSCOV2&INF A&B&RSV AMP PRB: CPT | Performed by: PHYSICIAN ASSISTANT

## 2023-12-13 PROCEDURE — 36415 COLL VENOUS BLD VENIPUNCTURE: CPT | Performed by: PHYSICIAN ASSISTANT

## 2023-12-13 PROCEDURE — 99284 EMERGENCY DEPT VISIT MOD MDM: CPT

## 2023-12-13 PROCEDURE — 2500000004 HC RX 250 GENERAL PHARMACY W/ HCPCS (ALT 636 FOR OP/ED): Performed by: PHYSICIAN ASSISTANT

## 2023-12-13 PROCEDURE — 80053 COMPREHEN METABOLIC PANEL: CPT | Performed by: PHYSICIAN ASSISTANT

## 2023-12-13 PROCEDURE — 83735 ASSAY OF MAGNESIUM: CPT | Performed by: PHYSICIAN ASSISTANT

## 2023-12-13 PROCEDURE — 99284 EMERGENCY DEPT VISIT MOD MDM: CPT | Performed by: PHYSICIAN ASSISTANT

## 2023-12-13 RX ORDER — ONDANSETRON 4 MG/1
4 TABLET, ORALLY DISINTEGRATING ORAL EVERY 8 HOURS PRN
Qty: 15 TABLET | Refills: 0 | Status: SHIPPED | OUTPATIENT
Start: 2023-12-13

## 2023-12-13 RX ORDER — POTASSIUM CHLORIDE 20 MEQ/1
20 TABLET, EXTENDED RELEASE ORAL DAILY
Status: DISCONTINUED | OUTPATIENT
Start: 2023-12-13 | End: 2023-12-13 | Stop reason: HOSPADM

## 2023-12-13 RX ORDER — DOXYCYCLINE 100 MG/1
100 TABLET ORAL 2 TIMES DAILY
Qty: 14 TABLET | Refills: 0 | Status: SHIPPED | OUTPATIENT
Start: 2023-12-13 | End: 2023-12-20

## 2023-12-13 RX ADMIN — POTASSIUM CHLORIDE 20 MEQ: 1500 TABLET, EXTENDED RELEASE ORAL at 14:11

## 2023-12-13 ASSESSMENT — COLUMBIA-SUICIDE SEVERITY RATING SCALE - C-SSRS
2. HAVE YOU ACTUALLY HAD ANY THOUGHTS OF KILLING YOURSELF?: NO
1. IN THE PAST MONTH, HAVE YOU WISHED YOU WERE DEAD OR WISHED YOU COULD GO TO SLEEP AND NOT WAKE UP?: NO
6. HAVE YOU EVER DONE ANYTHING, STARTED TO DO ANYTHING, OR PREPARED TO DO ANYTHING TO END YOUR LIFE?: NO

## 2023-12-13 ASSESSMENT — LIFESTYLE VARIABLES
REASON UNABLE TO ASSESS: NO
HAVE PEOPLE ANNOYED YOU BY CRITICIZING YOUR DRINKING: NO
EVER FELT BAD OR GUILTY ABOUT YOUR DRINKING: NO
EVER HAD A DRINK FIRST THING IN THE MORNING TO STEADY YOUR NERVES TO GET RID OF A HANGOVER: NO
HAVE YOU EVER FELT YOU SHOULD CUT DOWN ON YOUR DRINKING: NO

## 2023-12-13 NOTE — DISCHARGE INSTRUCTIONS
Sinusitis---one more dose of 7 days doxycycline; follow up with your ENT recommendation.   Your potassium was a little low and was repleted

## 2023-12-13 NOTE — ED TRIAGE NOTES
Pt presents to the ED with chronic sinus infections. Pt states that she had a CT yesterday that showed air in her sinuses. Pt states the ENT refused to give her more oral abx because she has had too many doses. Pt states she feels like she is getting worse. Sx include fevers, chills, n/v, face, throat, and neck pain.

## 2023-12-15 ENCOUNTER — APPOINTMENT (OUTPATIENT)
Dept: OTOLARYNGOLOGY | Facility: CLINIC | Age: 33
End: 2023-12-15
Payer: COMMERCIAL

## 2024-01-02 ENCOUNTER — APPOINTMENT (OUTPATIENT)
Dept: OTOLARYNGOLOGY | Facility: CLINIC | Age: 34
End: 2024-01-02
Payer: COMMERCIAL

## 2024-01-18 ENCOUNTER — LAB REQUISITION (OUTPATIENT)
Dept: LAB | Facility: HOSPITAL | Age: 34
End: 2024-01-18
Payer: COMMERCIAL

## 2024-01-18 LAB — SARS-COV-2 RNA RESP QL NAA+PROBE: DETECTED

## 2024-01-18 PROCEDURE — 87635 SARS-COV-2 COVID-19 AMP PRB: CPT

## 2024-09-27 ENCOUNTER — TELEMEDICINE (OUTPATIENT)
Dept: PRIMARY CARE | Facility: CLINIC | Age: 34
End: 2024-09-27
Payer: COMMERCIAL

## 2024-09-27 DIAGNOSIS — J01.00 ACUTE NON-RECURRENT MAXILLARY SINUSITIS: Primary | ICD-10-CM

## 2024-09-27 PROBLEM — H66.92 LEFT OTITIS MEDIA: Status: RESOLVED | Noted: 2023-11-10 | Resolved: 2024-09-27

## 2024-09-27 PROCEDURE — 99214 OFFICE O/P EST MOD 30 MIN: CPT | Performed by: FAMILY MEDICINE

## 2024-09-27 PROCEDURE — 1036F TOBACCO NON-USER: CPT | Performed by: FAMILY MEDICINE

## 2024-09-27 RX ORDER — DOXYCYCLINE 100 MG/1
100 CAPSULE ORAL 2 TIMES DAILY
Qty: 14 CAPSULE | Refills: 0 | Status: SHIPPED | OUTPATIENT
Start: 2024-09-27 | End: 2024-10-04

## 2024-09-27 RX ORDER — FLUCONAZOLE 150 MG/1
150 TABLET ORAL DAILY
Qty: 3 TABLET | Refills: 0 | Status: SHIPPED | OUTPATIENT
Start: 2024-09-27 | End: 2024-09-30

## 2024-09-27 ASSESSMENT — LIFESTYLE VARIABLES: HISTORY_OF_SMOKING: I HAVE NEVER SMOKED

## 2024-09-27 ASSESSMENT — ENCOUNTER SYMPTOMS
SINUS PRESSURE: 1
SINUS COMPLAINT: 1
HEADACHES: 1

## 2024-09-27 NOTE — PROGRESS NOTES
Subjective   Patient ID: Pat Dorado is a 34 y.o. female who presents for Sinus Problem.    Pat Dorado presents for a scheduled Telemedicine visit for sinus. Patient seen via synchronous audio and video platform. [place] the park with [place] puppy and I am in my office. Patient consents to being seen via telemedicine platform, understands the limitations of the platform, and understands that she can be seen in office if preferred.     Sinus symptoms for over a week. Drainage, upper teeth have started hurting, drainage worse    Sinus Problem  This is a new problem. The current episode started 1 to 4 weeks ago. The problem has been rapidly worsening since onset. There has been no fever. Associated symptoms include congestion, headaches and sinus pressure. Pertinent negatives include no sneezing. Past treatments include saline nose sprays.          Current Outpatient Medications:     amphetamine-dextroamphetamine XR (Adderall XR) 20 mg 24 hr capsule, Take 1 capsule (20 mg) by mouth once daily., Disp: , Rfl:     doxycycline (Vibramycin) 100 mg capsule, Take 1 capsule (100 mg) by mouth 2 times a day for 7 days., Disp: 14 capsule, Rfl: 0    fluconazole (Diflucan) 150 mg tablet, Take 1 tablet (150 mg) by mouth once daily for 3 days., Disp: 3 tablet, Rfl: 0    folic acid (Folvite) 1 mg tablet, Take 1 tablet (1 mg) by mouth once daily., Disp: , Rfl:     levonorgestrel (Mirena) 21 mcg/24 hours (8 yrs) 52 mg IUD, 52 mg by intrauterine route. one time only for 1 dose., Disp: , Rfl:     Linzess 290 mcg capsule, Take 1 capsule (290 mcg) by mouth once daily., Disp: , Rfl:     norethindrone (Aygestin) 5 mg tablet, Take 1 tablet (5 mg) by mouth once daily., Disp: , Rfl:     ondansetron ODT (Zofran-ODT) 4 mg disintegrating tablet, Take 1 tablet (4 mg) by mouth every 8 hours if needed for nausea or vomiting., Disp: 15 tablet, Rfl: 0    polyethylene glycol (Glycolax, Miralax) 17 gram/dose powder, MIX 17 GRAMS IN LIQUID  AND DRINK TWICE DAILY, Disp: , Rfl:     senna 8.6 mg tablet, Take 2 tablets (17.2 mg) by mouth once daily at bedtime., Disp: , Rfl:     sertraline (Zoloft) 25 mg tablet,  , Disp: , Rfl:     Patient Active Problem List   Diagnosis    Abdominal pain    Anemia, iron deficiency    Anxiety    Bilateral myopia    Delivery normal ([facility])    Dysfunction of both eustachian tubes    Endometriosis    History of laxative abuse    Hyperemesis gravidarum ([facility])    Hypertrophy of both inferior nasal turbinates    Bowel obstruction (Multi)    Ileus (Multi)    Left breast mass    Lipoma of torso    Mastitis    Mastitis, postpartum ([facility])    Myalgia    Non-O157 Shiga toxin-producing Escherichia coli (E.coli)    Otalgia    Rectal bleeding    Viral syndrome    Acute non-recurrent maxillary sinusitis         Review of Systems   HENT:  Positive for congestion and sinus pressure. Negative for sneezing.    Neurological:  Positive for headaches.       Objective   There were no vitals taken for this visit.    Physical Exam  Constitutional:       Appearance: Normal appearance.   Pulmonary:      Effort: Pulmonary effort is normal.   Neurological:      Mental Status: She is alert.   Psychiatric:         Mood and Affect: Mood normal.         Behavior: Behavior normal.         Assessment/Plan   Problem List Items Addressed This Visit       Acute non-recurrent maxillary sinusitis - Primary     Increase flonase to 2 times a day.   Not possibly pregnant.   Doxycycline 100 mg bid for 7 days.   Fluconazole rx given         Relevant Medications    doxycycline (Vibramycin) 100 mg capsule    fluconazole (Diflucan) 150 mg tablet         Assessment, plans, tests, and follow up discussed with patient and patient verbalized understanding. Pat was given an opportunity to ask questions and  any concerns were addressed including but not limited to medication, symptom management, follow up..

## 2024-09-27 NOTE — ASSESSMENT & PLAN NOTE
Increase flonase to 2 times a day.   Not possibly pregnant.   Doxycycline 100 mg bid for 7 days.   Fluconazole rx given

## 2024-10-12 ENCOUNTER — TELEMEDICINE (OUTPATIENT)
Dept: PRIMARY CARE | Facility: CLINIC | Age: 34
End: 2024-10-12
Payer: COMMERCIAL

## 2024-10-12 DIAGNOSIS — B37.9 YEAST INFECTION: ICD-10-CM

## 2024-10-12 DIAGNOSIS — J01.90 ACUTE NON-RECURRENT SINUSITIS, UNSPECIFIED LOCATION: Primary | ICD-10-CM

## 2024-10-12 PROCEDURE — 99213 OFFICE O/P EST LOW 20 MIN: CPT | Performed by: NURSE PRACTITIONER

## 2024-10-12 RX ORDER — FLUCONAZOLE 150 MG/1
150 TABLET ORAL SEE ADMIN INSTRUCTIONS
Qty: 2 TABLET | Refills: 0 | Status: SHIPPED | OUTPATIENT
Start: 2024-10-12 | End: 2024-10-13

## 2024-10-12 RX ORDER — DOXYCYCLINE 100 MG/1
100 CAPSULE ORAL 2 TIMES DAILY
Qty: 14 CAPSULE | Refills: 0 | Status: SHIPPED | OUTPATIENT
Start: 2024-10-12 | End: 2024-10-19

## 2024-10-12 ASSESSMENT — ENCOUNTER SYMPTOMS
CHILLS: 0
DIAPHORESIS: 0
COUGH: 0
SINUS PAIN: 1
FATIGUE: 1
SHORTNESS OF BREATH: 0
NECK PAIN: 0
SWOLLEN GLANDS: 0
HOARSE VOICE: 0
CONSTIPATION: 0
LIGHT-HEADEDNESS: 0
SINUS PRESSURE: 1
ACTIVITY CHANGE: 0
VOMITING: 0
SORE THROAT: 0
DIZZINESS: 0
APPETITE CHANGE: 0
NAUSEA: 1
SINUS COMPLAINT: 1
HEADACHES: 1
FEVER: 0

## 2024-10-12 ASSESSMENT — LIFESTYLE VARIABLES: HISTORY_OF_SMOKING: I HAVE NEVER SMOKED

## 2024-10-12 NOTE — PATIENT INSTRUCTIONS
Pleasure meeting with you today!    Let me know if you need anything.     Please send me a MyChart message if you have any questions or concerns.  FOR NON URGENT questions only.  Allow up to 72 hours for response.     If you have prescription issues or other questions you can email   Ministerio Alfonso,  Digital Health Coordinator, at   [email or web address]

## 2024-10-12 NOTE — PROGRESS NOTES
Subjective   Patient ID: Pat Dorado is a 34 y.o. female who presents for Sinus Problem.    Left face/teeth pain  Sx onset: 10 days ago  Sx include: fatigue, facial pain, teeth pain, sinus pressure.   OTC tx: Carol escobar; Flonase   Denies fever, vomiting  Complains of yeast infection with antibiotics; request fluconazole    Sinus Problem  This is a new problem. The current episode started 1 to 4 weeks ago. The problem has been waxing and waning since onset. There has been no fever. Her pain is at a severity of 5/10. The pain is moderate. Associated symptoms include congestion, headaches and sinus pressure. Pertinent negatives include no chills, coughing, diaphoresis, ear pain, hoarse voice, neck pain, shortness of breath, sore throat or swollen glands. Past treatments include saline nose sprays. The treatment provided no relief.        Review of Systems   Constitutional:  Positive for fatigue. Negative for activity change, appetite change, chills, diaphoresis and fever.   HENT:  Positive for congestion, postnasal drip, sinus pressure and sinus pain. Negative for ear pain, hoarse voice and sore throat.    Respiratory:  Negative for cough and shortness of breath.    Gastrointestinal:  Positive for nausea. Negative for constipation and vomiting.   Musculoskeletal:  Negative for neck pain.   Neurological:  Positive for headaches. Negative for dizziness and light-headedness.       Objective   There were no vitals taken for this visit.    Physical Exam  Constitutional:       General: She is not in acute distress.     Appearance: Normal appearance. She is not ill-appearing.      Comments: On Demand Virtual Visit Patient Consent     An interactive audio and video telecommunication system which permits real time communications between the patient (at the originating site) and provider (at the distant site) was utilized to provide this telehealth service.   Verbal consent was requested and obtained from Pat Dorado (or  parent if under 18) on this date, 24 for a telehealth visit.   I have verbally confirmed with Pat Dorado (or parent if under 18) that they are physically located in the [facility] during this virtual visit.    I performed this visit using realtime telehealth tools, including an audio/video OR telephone connection between the patient listed who was located [place] the [place] and myself, Finn Ortiz CNP (licensed in the [facility]).  At the start of the visit, I introduced myself as Finn Ortiz, Nurse practitioner and verified the patients name, , and current physical location.    If they were currently outside of the state of [place], the visit was ended and the patient was referred to alternative means for evaluation and treatment.   The patient was made aware of the limitations of the telehealth visit.  They will not be physically examined and all issues may not be appropriate for a telehealth visit.  If necessary, an in person referral will be made.       DISCLAIMER:   In preparing for this visit and writing this note, I reviewed previous electronic medical records (labs, imaging and medical charts) available.  Significant findings which helped in decision making are recorded in this encounter charting.    Telemedicine appropriate evaluation completed.  Unable to perform complete physical exam due to virtual visit, patient was visualized on interactive video.      Pulmonary:      Effort: Pulmonary effort is normal.   Neurological:      Mental Status: She is alert and oriented to person, place, and time.         Assessment/Plan   Diagnoses and all orders for this visit:  Acute non-recurrent sinusitis, unspecified location  -     doxycycline (Vibramycin) 100 mg capsule; Take 1 capsule (100 mg) by mouth 2 times a day for 7 days. Take with at least 8 ounces (large glass) of water, do not lie down for 30 minutes after  Advil cold/sinus for headache/congestion  Drink plenty of fluids  Wash hands frequently    Nasal saline as needed for nasal congestion   May try Flonase for increased nasal swelling.  Instill 2 squirts each nostril once daily x 2 weeks.    If you have good blood pressure, you can try Sudafed for 3-5 days      Yeast infection  -     fluconazole (Diflucan) 150 mg tablet; Take 1 tablet (150 mg) by mouth see administration instructions for 1 day. Take one tab now. Repeat in 3 days if symptoms persist.     Follow up with PCP if symptoms persist or worsen  Complete entire coarse of antibiotic

## 2024-11-03 ENCOUNTER — TELEMEDICINE (OUTPATIENT)
Dept: PRIMARY CARE | Facility: CLINIC | Age: 34
End: 2024-11-03
Payer: COMMERCIAL

## 2024-11-03 DIAGNOSIS — B37.9 ANTIBIOTIC-INDUCED YEAST INFECTION: ICD-10-CM

## 2024-11-03 DIAGNOSIS — J01.90 ACUTE NON-RECURRENT SINUSITIS, UNSPECIFIED LOCATION: Primary | ICD-10-CM

## 2024-11-03 DIAGNOSIS — T36.95XA ANTIBIOTIC-INDUCED YEAST INFECTION: ICD-10-CM

## 2024-11-03 PROCEDURE — 1036F TOBACCO NON-USER: CPT | Performed by: NURSE PRACTITIONER

## 2024-11-03 PROCEDURE — 99213 OFFICE O/P EST LOW 20 MIN: CPT | Performed by: NURSE PRACTITIONER

## 2024-11-03 RX ORDER — FLUCONAZOLE 150 MG/1
TABLET ORAL
Qty: 1 TABLET | Refills: 1 | Status: SHIPPED | OUTPATIENT
Start: 2024-11-03

## 2024-11-03 RX ORDER — DOXYCYCLINE 100 MG/1
100 CAPSULE ORAL 2 TIMES DAILY
Qty: 20 CAPSULE | Refills: 0 | Status: SHIPPED | OUTPATIENT
Start: 2024-11-03 | End: 2024-11-13

## 2024-11-03 ASSESSMENT — ENCOUNTER SYMPTOMS
FATIGUE: 1
SINUS PRESSURE: 1
WHEEZING: 0
HEADACHES: 1
NAUSEA: 1
VOMITING: 0
SHORTNESS OF BREATH: 0
MYALGIAS: 0
FEVER: 1
ABDOMINAL PAIN: 0
APPETITE CHANGE: 1
DIARRHEA: 0
RHINORRHEA: 1
COUGH: 1
SINUS PAIN: 1
SORE THROAT: 1
CHILLS: 0

## 2024-11-03 ASSESSMENT — LIFESTYLE VARIABLES: HISTORY_OF_SMOKING: I HAVE NEVER SMOKED

## 2024-11-27 ENCOUNTER — TELEMEDICINE (OUTPATIENT)
Dept: PRIMARY CARE | Facility: CLINIC | Age: 34
End: 2024-11-27
Payer: COMMERCIAL

## 2024-11-27 DIAGNOSIS — B37.9 ANTIBIOTIC-INDUCED YEAST INFECTION: ICD-10-CM

## 2024-11-27 DIAGNOSIS — T36.95XA ANTIBIOTIC-INDUCED YEAST INFECTION: ICD-10-CM

## 2024-11-27 DIAGNOSIS — J06.9 UPPER RESPIRATORY TRACT INFECTION, UNSPECIFIED TYPE: Primary | ICD-10-CM

## 2024-11-27 PROCEDURE — 99213 OFFICE O/P EST LOW 20 MIN: CPT

## 2024-11-27 RX ORDER — FLUCONAZOLE 150 MG/1
TABLET ORAL
Qty: 1 TABLET | Refills: 1 | Status: SHIPPED | OUTPATIENT
Start: 2024-11-27

## 2024-11-27 RX ORDER — AZITHROMYCIN 500 MG/1
500 TABLET, FILM COATED ORAL DAILY
Qty: 3 TABLET | Refills: 0 | Status: SHIPPED | OUTPATIENT
Start: 2024-11-27 | End: 2024-11-30

## 2024-12-01 ENCOUNTER — PHARMACY VISIT (OUTPATIENT)
Dept: PHARMACY | Facility: CLINIC | Age: 34
End: 2024-12-01
Payer: MEDICARE

## 2024-12-01 PROCEDURE — RXOTC WILLOW AMBULATORY OTC CHARGE

## 2024-12-01 PROCEDURE — RXMED WILLOW AMBULATORY MEDICATION CHARGE

## 2024-12-01 RX ORDER — AZITHROMYCIN 250 MG/1
TABLET, FILM COATED ORAL
Qty: 6 TABLET | Refills: 0 | OUTPATIENT
Start: 2024-12-01 | End: 2024-12-06

## 2024-12-12 ENCOUNTER — OFFICE VISIT (OUTPATIENT)
Dept: NEUROLOGY | Facility: CLINIC | Age: 34
End: 2024-12-12
Payer: COMMERCIAL

## 2024-12-12 DIAGNOSIS — G43.711 INTRACTABLE CHRONIC MIGRAINE WITHOUT AURA AND WITH STATUS MIGRAINOSUS: Primary | ICD-10-CM

## 2024-12-12 PROCEDURE — 1036F TOBACCO NON-USER: CPT | Performed by: STUDENT IN AN ORGANIZED HEALTH CARE EDUCATION/TRAINING PROGRAM

## 2024-12-12 PROCEDURE — 99205 OFFICE O/P NEW HI 60 MIN: CPT | Performed by: STUDENT IN AN ORGANIZED HEALTH CARE EDUCATION/TRAINING PROGRAM

## 2024-12-12 PROCEDURE — 64405 NJX AA&/STRD GR OCPL NRV: CPT | Mod: 50 | Performed by: STUDENT IN AN ORGANIZED HEALTH CARE EDUCATION/TRAINING PROGRAM

## 2024-12-12 PROCEDURE — 99215 OFFICE O/P EST HI 40 MIN: CPT | Mod: 25 | Performed by: STUDENT IN AN ORGANIZED HEALTH CARE EDUCATION/TRAINING PROGRAM

## 2024-12-12 PROCEDURE — 64405 NJX AA&/STRD GR OCPL NRV: CPT | Performed by: STUDENT IN AN ORGANIZED HEALTH CARE EDUCATION/TRAINING PROGRAM

## 2024-12-12 PROCEDURE — 2500000004 HC RX 250 GENERAL PHARMACY W/ HCPCS (ALT 636 FOR OP/ED): Performed by: STUDENT IN AN ORGANIZED HEALTH CARE EDUCATION/TRAINING PROGRAM

## 2024-12-12 RX ORDER — LIDOCAINE HYDROCHLORIDE 10 MG/ML
1.5 INJECTION, SOLUTION INFILTRATION; PERINEURAL ONCE
Status: COMPLETED | OUTPATIENT
Start: 2024-12-12 | End: 2024-12-12

## 2024-12-12 RX ORDER — ONDANSETRON 4 MG/1
4 TABLET, ORALLY DISINTEGRATING ORAL EVERY 8 HOURS PRN
Qty: 20 TABLET | Refills: 0 | Status: SHIPPED | OUTPATIENT
Start: 2024-12-12 | End: 2024-12-19

## 2024-12-12 RX ORDER — BUPIVACAINE HYDROCHLORIDE 5 MG/ML
1.5 INJECTION, SOLUTION EPIDURAL; INTRACAUDAL ONCE
Status: COMPLETED | OUTPATIENT
Start: 2024-12-12 | End: 2024-12-12

## 2024-12-12 RX ORDER — RIZATRIPTAN BENZOATE 10 MG/1
10 TABLET, ORALLY DISINTEGRATING ORAL ONCE AS NEEDED
Qty: 9 TABLET | Refills: 6 | Status: SHIPPED | OUTPATIENT
Start: 2024-12-12 | End: 2025-01-11

## 2024-12-12 ASSESSMENT — PATIENT HEALTH QUESTIONNAIRE - PHQ9
2. FEELING DOWN, DEPRESSED OR HOPELESS: NOT AT ALL
1. LITTLE INTEREST OR PLEASURE IN DOING THINGS: NOT AT ALL
SUM OF ALL RESPONSES TO PHQ9 QUESTIONS 1 AND 2: 0

## 2024-12-12 ASSESSMENT — ENCOUNTER SYMPTOMS
LOSS OF SENSATION IN FEET: 0
DEPRESSION: 0
OCCASIONAL FEELINGS OF UNSTEADINESS: 0

## 2024-12-12 NOTE — PROGRESS NOTES
Subjective     Chief Complaint: Headache    Pat Dorado is a 34 y.o. year old female who presents with chief complaint of headaches.    Pat started getting headaches in singe digit age. Was having headaches a few times a year. Headaches gradually worsening in frequency and severity. Currently having 30/30 HA days per month. The headaches are usually dull and are located occipital and frontal, generally symmetric. The patient rates her most severe headaches a 10 in intensity. Generally, headaches last about 48-72 hours in duration. Associated nausea, photophobia, and phonophobia. Headaches are worsened with exertion. Triggers include barometric pressure  and menses.    Aura of waves and distortions lasting 1 hour in duration. Occurs with more severe headaches.     Has associated Raynauds and nocturnal bruxism.     Had an MRI in the past and was told there was shadowing in the past that was unclear.    Current Acute Headache Treatment Ibuprofen  Tylenol   Current Preventative Headache Treatment None   Previous Acute Headache Treatment Sumatriptan (neck stiffness, not effective)   Previous Preventative Headache Treatment Amitriptyline   Metoprolol  (Not effective)     ROS: As per HPI, otherwise all other systems have been reviewed are negative for complaint.     Past Medical History:   Diagnosis Date    Anxiety disorder, unspecified     Anxiety    Infection of nipple associated with the puerperium ([facility]) 2019    Yeast infection of nipple, postpartum    Nonpurulent mastitis associated with the puerperium ([facility]) 2019    Mastitis, postpartum    Personal history of other complications of pregnancy, childbirth and the puerperium 2019    History of threatened     Personal history of other diseases of the female genital tract 2017    History of endometritis    Personal history of other mental and behavioral disorders 2017    History of depression     Past Surgical History:    Procedure Laterality Date    LAPAROSCOPY DIAGNOSTIC / BIOPSY / ASPIRATION / LYSIS  08/16/2017    Laparoscopy (Diagnostic)    OTHER SURGICAL HISTORY  07/06/2015    Appendiceal Laparoscopy     Family History   Problem Relation Name Age of Onset    Endometriosis Mother      Migraines Mother       Social History     Tobacco Use    Smoking status: Never     Passive exposure: Never    Smokeless tobacco: Never   Substance Use Topics    Alcohol use: Yes     Comment: socially        Objective   There were no vitals taken for this visit.    Neuro Exam:  Cardiac Exam: No apparent edema of b/l lower extremities  Neurological Exam:  MENTAL STATUS:   General Appearance: No distress, alert, interactive, and cooperative. Orientation was normal to time, place and person. Recent and remote memory was intact.     OPHTHALMOSCOPIC:   The ophthalmoscopic exam was normal. The fundi were well visualized with normal disc margins, clear vessels and vascular pulsations. No disc edema. The cup/disk ratio was not enlarged. No hemorrhages or exudates were present in the posterior segments that were visualized.     CRANIAL NERVES:   CN 2         Visual fields full to confrontation.   CN 3, 4, 6   Pupils round, 4 mm in diameter, equally reactive to light. Lids symmetric; no ptosis. EOMs normal alignment, full range with normal saccades, pursuit and convergence.   No nystagmus.   CN 5   Facial sensation intact bilaterally.   CN 7   Normal and symmetric facial strength. Nasolabial folds symmetric.   CN 8   Hearing intact to conversation and finger rub.  CN 9, 10   Palate elevates symmetrically.  CN 11   Normal strength of shoulder shrug and neck turning.   CN 12   Tongue midline, with normal bulk and strength; no fasciculations.     MOTOR:   Muscle bulk and tone were normal in both upper and lower extremities.   No pronator drift bilaterally.  No fasciculations, tremor or other abnormal movements evident with the patient examined  clothed.    STRENGTH:  R  L  Deltoid            5          5  Biceps  5 5  Triceps  5 5    Hip flexion 5 5  Knee Flex 5 5  Knee Ex 5 5    REFLEXES: R L  Biceps  2 2                     Triceps  2 2  Patellar  2 2     SENSORY:   In both upper and lower extremities, sensation was intact to light touch.    COORDINATION:   In both upper extremities, finger-nose-finger was intact without dysmetria or overshoot.     GAIT:   Station was stable with a normal base. Gait was stable with a normal arm swing and speed. No ataxia, shuffling, steppage or waddling was present. No circumduction was present. No Romberg sign was present.    Assessment/Plan   Given the description and frequency of headaches, patient likely has chronic migraine wwo aura. Patient had trialed amitritpyline and metoprolol in the past without adequate response. Per our discussion, will aim to start Botox for migraine ppx. For breakthrough headaches, will start rizatriptan. Currently having a very severe headache that has lasted 3+ days, thus will do b/l GONB.     - b/l GONB  - start Botox PREEMPT  - start rizatriptan 10mg PRN    I personally spent 63 minutes today, exclusive of procedures, providing care for this patient, including preparation, face to face time, documentation and other services such as review of medical records, diagnostic result, patient education, counseling, coordination of care as specified in the encounter.   ____________________________  Nerve Block    Date/Time: 12/12/2024 4:19 PM    Performed by: Sam Vernon DO  Authorized by: Sam Vernon DO    Comments:      Procedure Note: Bilateral Greater Occipital Nerve Block    Indications: severe bilateral occipital pain    Informed consent was obtained (explaining the procedure and risks and benefits of procedure) from patient: the signed consent form was placed in the medical record.  A time out was completed, verifying correct patient, procedure,site, positioning, and implants or  special equipment.    Patient's left occipital area was palpated to identify location of greater occipital nerve. Alcohol was applied topically to the skin. Using a 30 gauge needle (aspirating during insertion), 3cc of a 1:1 mixture of 1% lidocaine and 0.5% bupivacaine was injected on the left side. Pressure with a gauze pad was held briefly upon the site of puncture to minimize bleeding and to further spread anaesthetic subcutaneously. The procedure was repeated on the right side, injecting another 3cc on that side.    There were no complications. Patient was comfortable and left without complaint.

## 2024-12-21 ENCOUNTER — TELEMEDICINE (OUTPATIENT)
Dept: PRIMARY CARE | Facility: CLINIC | Age: 34
End: 2024-12-21
Payer: COMMERCIAL

## 2024-12-21 DIAGNOSIS — J01.40 ACUTE NON-RECURRENT PANSINUSITIS: Primary | ICD-10-CM

## 2024-12-21 PROCEDURE — 99214 OFFICE O/P EST MOD 30 MIN: CPT | Performed by: NURSE PRACTITIONER

## 2024-12-21 PROCEDURE — 1036F TOBACCO NON-USER: CPT | Performed by: NURSE PRACTITIONER

## 2024-12-21 RX ORDER — FLUCONAZOLE 150 MG/1
150 TABLET ORAL ONCE
Qty: 2 TABLET | Refills: 0 | Status: SHIPPED | OUTPATIENT
Start: 2024-12-21 | End: 2024-12-21

## 2024-12-21 RX ORDER — DOXYCYCLINE 100 MG/1
100 CAPSULE ORAL 2 TIMES DAILY
Qty: 14 CAPSULE | Refills: 0 | Status: SHIPPED | OUTPATIENT
Start: 2024-12-21 | End: 2024-12-28

## 2024-12-21 ASSESSMENT — ENCOUNTER SYMPTOMS: SINUS COMPLAINT: 1

## 2024-12-21 ASSESSMENT — LIFESTYLE VARIABLES: HISTORY_OF_SMOKING: I HAVE NEVER SMOKED

## 2024-12-21 NOTE — ASSESSMENT & PLAN NOTE
- doxycycline twice a day 7 days  - medrol dose pack  -  Nasal saline, increase fluids  -  hand hygiene and infection prevention discussed  -  Warm compress to sinuses  - humidification  - recommend to eat yogurt twice daily while taking antibiotic or a daily probiotic  - fluconazole sent in for yeast infection

## 2024-12-21 NOTE — PROGRESS NOTES
Subjective   Patient ID: Pat Dorado is a 34 y.o. female who presents for Sinus Problem.    Virtual or Telephone Consent    An interactive audio and video telecommunication system which permits real time communications between the patient (at the originating site) and provider (at the distant site) was utilized to provide this telehealth service.   Verbal consent was requested and obtained from Pat Dorado on this date, 12/21/24 for a telehealth visit.   Patient is located in [place]    Sinus pressure, congestion, sinus headache and PND x 9 days  Symptoms have been getting worse over the past 2-3 days.  Tmax 100 today  Taking ibuprofen and sudafed with minimal relief  Flonase and zyrtec not helping    Sinus Problem         Review of Systems   All other systems reviewed and are negative.      Objective   There were no vitals taken for this visit.    Physical Exam    Assessment/Plan   Problem List Items Addressed This Visit             ICD-10-CM    Acute non-recurrent pansinusitis - Primary J01.40     - doxycycline twice a day 7 days  - medrol dose pack  -  Nasal saline, increase fluids  -  hand hygiene and infection prevention discussed  -  Warm compress to sinuses  - humidification  - recommend to eat yogurt twice daily while taking antibiotic or a daily probiotic

## 2024-12-30 ENCOUNTER — APPOINTMENT (OUTPATIENT)
Dept: PRIMARY CARE | Facility: CLINIC | Age: 34
End: 2024-12-30
Payer: COMMERCIAL

## 2025-01-06 ENCOUNTER — PROCEDURE VISIT (OUTPATIENT)
Dept: NEUROLOGY | Facility: CLINIC | Age: 35
End: 2025-01-06
Payer: COMMERCIAL

## 2025-01-06 DIAGNOSIS — G43.709 CHRONIC MIGRAINE WITHOUT AURA WITHOUT STATUS MIGRAINOSUS, NOT INTRACTABLE: Primary | ICD-10-CM

## 2025-01-06 PROCEDURE — 2500000004 HC RX 250 GENERAL PHARMACY W/ HCPCS (ALT 636 FOR OP/ED): Mod: JZ | Performed by: STUDENT IN AN ORGANIZED HEALTH CARE EDUCATION/TRAINING PROGRAM

## 2025-01-06 PROCEDURE — 96372 THER/PROPH/DIAG INJ SC/IM: CPT | Performed by: STUDENT IN AN ORGANIZED HEALTH CARE EDUCATION/TRAINING PROGRAM

## 2025-01-06 PROCEDURE — 64615 CHEMODENERV MUSC MIGRAINE: CPT | Performed by: STUDENT IN AN ORGANIZED HEALTH CARE EDUCATION/TRAINING PROGRAM

## 2025-01-06 RX ORDER — ESCITALOPRAM OXALATE 10 MG/1
TABLET ORAL
COMMUNITY

## 2025-01-06 RX ORDER — AZELASTINE 1 MG/ML
2 SPRAY, METERED NASAL 2 TIMES DAILY
COMMUNITY
Start: 2023-12-27

## 2025-01-06 RX ORDER — BUSPIRONE HYDROCHLORIDE 10 MG/1
1 TABLET ORAL
COMMUNITY
Start: 2024-12-03

## 2025-01-06 RX ORDER — DOXYCYCLINE 100 MG/1
TABLET ORAL
COMMUNITY

## 2025-01-06 RX ORDER — CETIRIZINE HYDROCHLORIDE 10 MG/1
10 TABLET ORAL
COMMUNITY

## 2025-01-06 RX ORDER — BUPROPION HYDROCHLORIDE 100 MG/1
TABLET ORAL
COMMUNITY

## 2025-01-06 RX ORDER — PREDNISONE 20 MG/1
TABLET ORAL
COMMUNITY
Start: 2024-06-11 | End: 2025-01-07 | Stop reason: ALTCHOICE

## 2025-01-06 RX ORDER — AMOXICILLIN AND CLAVULANATE POTASSIUM 875; 125 MG/1; MG/1
1 TABLET, FILM COATED ORAL 2 TIMES DAILY
COMMUNITY

## 2025-01-06 RX ORDER — DEXTROAMPHETAMINE SACCHARATE, AMPHETAMINE ASPARTATE, DEXTROAMPHETAMINE SULFATE AND AMPHETAMINE SULFATE 2.5; 2.5; 2.5; 2.5 MG/1; MG/1; MG/1; MG/1
1 TABLET ORAL
COMMUNITY
Start: 2024-12-20

## 2025-01-06 RX ORDER — OMEPRAZOLE 20 MG/1
TABLET, DELAYED RELEASE ORAL
COMMUNITY

## 2025-01-06 RX ORDER — AMOXICILLIN 500 MG/1
1 CAPSULE ORAL
COMMUNITY
Start: 2024-07-08 | End: 2025-01-07 | Stop reason: ALTCHOICE

## 2025-01-06 RX ORDER — METHYLPREDNISOLONE 4 MG/1
TABLET ORAL
COMMUNITY
Start: 2024-08-23 | End: 2025-01-07 | Stop reason: ALTCHOICE

## 2025-01-06 RX ORDER — SULFAMETHOXAZOLE AND TRIMETHOPRIM 800; 160 MG/1; MG/1
TABLET ORAL
COMMUNITY
Start: 2023-08-06 | End: 2025-01-07 | Stop reason: ALTCHOICE

## 2025-01-06 RX ORDER — DOXYCYCLINE HYCLATE 100 MG
1 TABLET ORAL 2 TIMES DAILY
COMMUNITY
End: 2025-01-07 | Stop reason: ALTCHOICE

## 2025-01-06 RX ORDER — AMOXICILLIN 875 MG/1
TABLET, FILM COATED ORAL
COMMUNITY
Start: 2024-06-11 | End: 2025-01-07 | Stop reason: ALTCHOICE

## 2025-01-06 RX ORDER — DOCUSATE SODIUM 100 MG/1
CAPSULE, LIQUID FILLED ORAL
COMMUNITY

## 2025-01-06 RX ORDER — LEVOFLOXACIN 250 MG/1
TABLET ORAL
COMMUNITY
Start: 2024-07-09 | End: 2025-01-07 | Stop reason: ALTCHOICE

## 2025-01-06 RX ORDER — DEXTROAMPHETAMINE SACCHARATE, AMPHETAMINE ASPARTATE MONOHYDRATE, DEXTROAMPHETAMINE SULFATE AND AMPHETAMINE SULFATE 2.5; 2.5; 2.5; 2.5 MG/1; MG/1; MG/1; MG/1
20 CAPSULE, EXTENDED RELEASE ORAL
COMMUNITY
Start: 2024-10-28

## 2025-01-06 RX ORDER — PANTOPRAZOLE SODIUM 40 MG/1
40 TABLET, DELAYED RELEASE ORAL
COMMUNITY

## 2025-01-06 RX ORDER — LEVOFLOXACIN 500 MG/1
TABLET, FILM COATED ORAL
COMMUNITY
Start: 2024-08-23 | End: 2025-01-07 | Stop reason: ALTCHOICE

## 2025-01-06 RX ORDER — FLUCONAZOLE 150 MG/1
TABLET ORAL
COMMUNITY
Start: 2024-12-21

## 2025-01-06 RX ORDER — DOXYCYCLINE 100 MG/1
CAPSULE ORAL
COMMUNITY
End: 2025-01-07 | Stop reason: ALTCHOICE

## 2025-01-06 RX ORDER — SERTRALINE HYDROCHLORIDE 50 MG/1
1.5 TABLET, FILM COATED ORAL
COMMUNITY
Start: 2024-12-23

## 2025-01-06 RX ADMIN — ONABOTULINUMTOXINA 155 UNITS: 100 INJECTION, POWDER, LYOPHILIZED, FOR SOLUTION INTRADERMAL; INTRAMUSCULAR at 10:29

## 2025-01-07 ENCOUNTER — TELEMEDICINE (OUTPATIENT)
Dept: PRIMARY CARE | Facility: CLINIC | Age: 35
End: 2025-01-07
Payer: COMMERCIAL

## 2025-01-07 DIAGNOSIS — J01.00 ACUTE NON-RECURRENT MAXILLARY SINUSITIS: Primary | ICD-10-CM

## 2025-01-07 PROCEDURE — 1036F TOBACCO NON-USER: CPT | Performed by: NURSE PRACTITIONER

## 2025-01-07 PROCEDURE — 99214 OFFICE O/P EST MOD 30 MIN: CPT | Performed by: NURSE PRACTITIONER

## 2025-01-07 RX ORDER — AMOXICILLIN AND CLAVULANATE POTASSIUM 875; 125 MG/1; MG/1
1 TABLET, FILM COATED ORAL 2 TIMES DAILY
Qty: 14 TABLET | Refills: 0 | Status: SHIPPED | OUTPATIENT
Start: 2025-01-07 | End: 2025-01-14

## 2025-01-07 RX ORDER — FLUCONAZOLE 150 MG/1
150 TABLET ORAL ONCE
Qty: 2 TABLET | Refills: 0 | Status: SHIPPED | OUTPATIENT
Start: 2025-01-07 | End: 2025-01-07

## 2025-01-07 ASSESSMENT — LIFESTYLE VARIABLES: HISTORY_OF_SMOKING: I HAVE NEVER SMOKED

## 2025-01-07 NOTE — ASSESSMENT & PLAN NOTE
- augmentin twice a day x 7 days  - fluconazole for yeast infection  -  Nasal saline, increase fluids  -  hand hygiene and infection prevention discussed  -  Warm compress to sinuses  - humidification  - recommend to eat yogurt twice daily while taking antibiotic or a daily probiotic

## 2025-01-07 NOTE — PROGRESS NOTES
Subjective   Patient ID: Pat Dorado is a 34 y.o. female who presents for Sinusitis.    Virtual or Telephone Consent    An interactive audio and video telecommunication system which permits real time communications between the patient (at the originating site) and provider (at the distant site) was utilized to provide this telehealth service.   Verbal consent was requested and obtained from Pat Dorado on this date, 01/07/25 for a telehealth visit.   Patient is located in [place]      Sinus pressure, congestion, cough, PND, and sore throat x 10 days.  Denies fever, shortness of breath or chest pain.  Started to feel pain in left upper jaw approx 3 days ago  Tried over the counter cold medications without relief     Sinusitis         Review of Systems    Objective   There were no vitals taken for this visit.    Physical Exam    Assessment/Plan   Problem List Items Addressed This Visit             ICD-10-CM    Acute non-recurrent maxillary sinusitis - Primary J01.00     - augmentin twice a day x 7 days  - fluconazole for yeast infection  -  Nasal saline, increase fluids  -  hand hygiene and infection prevention discussed  -  Warm compress to sinuses  - humidification  - recommend to eat yogurt twice daily while taking antibiotic or a daily probiotic

## 2025-02-22 ENCOUNTER — TELEMEDICINE (OUTPATIENT)
Dept: PRIMARY CARE | Facility: CLINIC | Age: 35
End: 2025-02-22
Payer: COMMERCIAL

## 2025-02-22 DIAGNOSIS — J06.9 VIRAL UPPER RESPIRATORY TRACT INFECTION: Primary | ICD-10-CM

## 2025-02-22 PROCEDURE — 99213 OFFICE O/P EST LOW 20 MIN: CPT | Performed by: NURSE PRACTITIONER

## 2025-02-22 RX ORDER — METHYLPREDNISOLONE 4 MG/1
TABLET ORAL
Qty: 21 TABLET | Refills: 0 | Status: SHIPPED | OUTPATIENT
Start: 2025-02-22 | End: 2025-02-28

## 2025-02-22 ASSESSMENT — LIFESTYLE VARIABLES: HISTORY_OF_SMOKING: I HAVE NEVER SMOKED

## 2025-02-22 NOTE — PROGRESS NOTES
Occupational [address] [employer]  Occupational Therapy Not Seen Note    DATE: 2020  Name: Maya Harper  : 1958  MRN: [id number]    Patient not available for Occupational Therapy due to:      [x]Refusal by Patient: Attempted Eval this AM. Pt agreeable to session and writer obtained home setup information. Socks were donned and when lower part of bed was flattened pt began to scream out in pain and became quickly agitated. Pt asked for cream for knee, RN notified for permission. Attempted to assist pt to make more comfortable, but only wanted to speak to nurse. Nurse arrived and provided medication. Left with RN in room. Pt unwilling to complete evaluation. Will check back as able.           Next Scheduled Treatment:  Will check back as able     Nevaeh IBARRA/JUSTIN Pat Dorado is a 34 y.o. female who presents virtually today for an acute sick visit    I performed this visit using real-time telehealth tools, including an audio/video connection between Pat Dorado  and myself, Charley Wood CNP,  within the state of [place].  Consent has been obtained for this visit.  I have verbally confirmed with Pat Dorado (or parent if under 18) that they are physically located in the [facility] during this virtual visit.  Telemedicine appropriate evaluation completed.  Unable to perform complete physical exam due to virtual visit.    Chief Complaint   Patient presents with    Sinus Problem       Symptoms: Sinus drainage, mucous, PND   Pt states she only coughs when mucous is going down the back of her throat   No fevers/chills   Has taken: sudafed, Netti pot, sinus medication    No facial pain     Allergies   Allergen Reactions    Cefaclor Angioedema, Other, Swelling and Anaphylaxis     Swollen joints    Erythromycin Other and Rash    Sulfamethoxazole-Trimethoprim Other and Rash       Review of Systems  ROS was completed and all systems are negative with the exception of what was noted in the the HPI.       Objective   Vitals:  There were no vitals taken for this visit.      Current Outpatient Medications   Medication Instructions    amoxicillin-pot clavulanate (Augmentin) 875-125 mg tablet 1 tablet, oral, 2 times daily    amphetamine-dextroamphetamine (Adderall) 10 mg tablet 1 tablet, Every 12 hours scheduled (0630,1830)    amphetamine-dextroamphetamine XR (Adderall XR) 10 mg 24 hr capsule 20 mg, Daily before breakfast    amphetamine-dextroamphetamine XR (Adderall XR) 20 mg 24 hr capsule 20 mg, Daily    azelastine (Astelin) 137 mcg (0.1 %) nasal spray 2 sprays, 2 times daily    buPROPion (Wellbutrin) 100 mg tablet oral    busPIRone (Buspar) 10 mg tablet 1 tablet, 3 times daily (0900,1400,1900)    cetirizine (ZYRTEC) 10 mg, oral, Daily RT    docusate sodium (Colace) 100 mg capsule oral     doxycycline (Adoxa) 100 mg tablet TAKE 1 TABLET BY MOUTH TWICE DAILY FOR 7 DAYS . TAKE WITH FULL GLASS OF WATER AND DO NOT LIE DOWN FOR AT LEAST 30 MINUTES    escitalopram (Lexapro) 10 mg tablet oral    fluconazole (Diflucan) 150 mg tablet     levonorgestrel (Mirena) 21 mcg/24 hours (8 yrs) 52 mg IUD 1 each    Linzess 290 mcg, Daily RT    methylPREDNISolone (Medrol Dospak) 4 mg tablets Take as directed on package.    omeprazole OTC (PriLOSEC OTC) 20 mg EC tablet oral    pantoprazole (PROTONIX) 40 mg, oral, 2 times daily before meals    polyethylene glycol (Glycolax, Miralax) 17 gram/dose powder MIX 17 GRAMS IN LIQUID AND DRINK TWICE DAILY    rizatriptan MLT (MAXALT-MLT) 10 mg, oral, Once as needed, May repeat in 2 hours if unresolved. Do not exceed 30 mg in 24 hours.    senna 8.6 mg tablet 2 tablets, Nightly    sertraline (Zoloft) 25 mg tablet      sertraline (Zoloft) 50 mg tablet 1.5 tablets, Daily (0630)         Physical Exam  Pulmonary:      Effort: Pulmonary effort is normal.   Neurological:      Mental Status: She is alert and oriented to person, place, and time.   Psychiatric:         Mood and Affect: Mood normal.         Behavior: Behavior normal.         Thought Content: Thought content normal.         Assessment & Plan  Viral upper respiratory tract infection  Rx Medrol Dosepak  Encouraged rest, hydration and Vitamin C, Zinc, Echinacea   Orders:    methylPREDNISolone (Medrol Dospak) 4 mg tablets; Take as directed on package.

## 2025-04-14 ENCOUNTER — PROCEDURE VISIT (OUTPATIENT)
Dept: NEUROLOGY | Facility: CLINIC | Age: 35
End: 2025-04-14
Payer: COMMERCIAL

## 2025-04-14 DIAGNOSIS — G43.709 CHRONIC MIGRAINE W/O AURA W/O STATUS MIGRAINOSUS, NOT INTRACTABLE: Primary | ICD-10-CM

## 2025-04-14 PROCEDURE — 64615 CHEMODENERV MUSC MIGRAINE: CPT | Performed by: STUDENT IN AN ORGANIZED HEALTH CARE EDUCATION/TRAINING PROGRAM

## 2025-04-14 PROCEDURE — 2500000004 HC RX 250 GENERAL PHARMACY W/ HCPCS (ALT 636 FOR OP/ED): Mod: JZ | Performed by: STUDENT IN AN ORGANIZED HEALTH CARE EDUCATION/TRAINING PROGRAM

## 2025-04-14 RX ORDER — AZITHROMYCIN 250 MG/1
TABLET, FILM COATED ORAL
COMMUNITY
Start: 2025-02-22 | End: 2025-04-16 | Stop reason: ALTCHOICE

## 2025-04-14 RX ORDER — DOXYCYCLINE 100 MG/1
1 CAPSULE ORAL
COMMUNITY
Start: 2025-03-08 | End: 2025-04-16 | Stop reason: ALTCHOICE

## 2025-04-14 RX ADMIN — ONABOTULINUMTOXINA 165 UNITS: 100 INJECTION, POWDER, LYOPHILIZED, FOR SOLUTION INTRADERMAL; INTRAMUSCULAR at 10:58

## 2025-04-14 NOTE — PROGRESS NOTES
Subjective   Since last Botox, having 8/30 HA days per month. Denies any side effects to Botox. Has pain in the jaw still.      Botox    Date/Time: 4/14/2025 10:51 AM    Performed by: Sam Vernon DO  Authorized by: Sam Vernon DO    Procedure specific details:      Procedure Note: PREEMPT Botox    Indications: Chronic Migraine    Informed consent was obtained (explaining the procedure and risks and benefits of procedure) from patient: the signed consent form was placed in the medical record.  A time out was completed, verifying correct patient, procedure,site, positioning, and implants or special equipment.    200 units of OnabotulinumtoxinA were reconstituted with saline. Sites of injection were identified via PREEMPT protocol and cleaned with alcohol pads. Using a 30 gauge needle, patient received following injections:    5 units in procerus  5 units in each left and right   10 units divided between 2 sites of L frontalis  10 units divided between 2 sites of R frontalis  20 units divided between 4 sites of L temporalis  20 units divided between 4 sites of R temporalis  15 units divided between 3 sites of L occipitalis  15 units divided between 3 sites of R occipitalis  10 units divided between 2 sites of L paracervical muscles  10 units divided between 2 sites of R paracervical muscles  15 units divided between 3 sites of L trapezius  15 units divided between 3 sites of R trapezius    Additional Sites:  5u L masseter  5u R masseter    Used: 165u  Wasted: 35u    There were no complications. Patient was comfortable and left without complaint.     OnabotulinumtoxinA  Lot #: [id number]  Exp: 5/2027

## 2025-04-16 ENCOUNTER — TELEMEDICINE (OUTPATIENT)
Dept: PRIMARY CARE | Facility: CLINIC | Age: 35
End: 2025-04-16
Payer: COMMERCIAL

## 2025-04-16 DIAGNOSIS — J01.90 ACUTE BACTERIAL SINUSITIS: Primary | ICD-10-CM

## 2025-04-16 DIAGNOSIS — B96.89 ACUTE BACTERIAL SINUSITIS: Primary | ICD-10-CM

## 2025-04-16 PROCEDURE — 1036F TOBACCO NON-USER: CPT

## 2025-04-16 PROCEDURE — 99213 OFFICE O/P EST LOW 20 MIN: CPT

## 2025-04-16 RX ORDER — AMOXICILLIN AND CLAVULANATE POTASSIUM 875; 125 MG/1; MG/1
1 TABLET, FILM COATED ORAL 2 TIMES DAILY
Qty: 20 TABLET | Refills: 0 | Status: SHIPPED | OUTPATIENT
Start: 2025-04-16 | End: 2025-04-26

## 2025-04-16 ASSESSMENT — LIFESTYLE VARIABLES: HISTORY_OF_SMOKING: I HAVE NEVER SMOKED

## 2025-04-16 NOTE — PROGRESS NOTES
This visit was completed via video conference. All issues as below were discussed and addressed but no physical exam was performed. If it was felt that the patient should be evaluated in clinic than they were directed there. The patient verbally consented to the visit.    An interactive audio and video telecommunication system which permits real time communications between the patient (at the originating site) and provider (at the distant site) was utilized to provide this telehealth service.     Verbal consent was requested and obtained from Pat Dorado on this date, 04/16/25 for a telehealth visit.     I have verbally confirmed with Pat Dorado(or parent if under 18) that they are physically located in the [facility] during this virtual visit.    Subjective   Pat Dorado is a 34 y.o. female who presents for evaluation of sinus pain. Symptoms include: congestion, cough, facial pain, fevers, headaches, mouth breathing, nasal congestion, post nasal drip, purulent rhinorrhea, and sinus pressure. Onset of symptoms was 7 week ago. Symptoms have been gradually worsening since that time. Past history is significant for no history of pneumonia or bronchitis.    Objective   Physical Exam   VS/ PE not completed what is mentioned was visualized via video chat.     Assessment/Plan   #Sinusitis  Augmentin  Supportive care, continue nasal sprays, and OTC.   If no improvement seek attention from ED/urgent care.

## 2025-06-08 ENCOUNTER — TELEMEDICINE (OUTPATIENT)
Dept: PRIMARY CARE | Facility: CLINIC | Age: 35
End: 2025-06-08
Payer: COMMERCIAL

## 2025-06-08 DIAGNOSIS — T36.95XA ANTIBIOTIC-INDUCED YEAST INFECTION: ICD-10-CM

## 2025-06-08 DIAGNOSIS — J01.00 ACUTE NON-RECURRENT MAXILLARY SINUSITIS: Primary | ICD-10-CM

## 2025-06-08 DIAGNOSIS — B37.9 ANTIBIOTIC-INDUCED YEAST INFECTION: ICD-10-CM

## 2025-06-08 PROCEDURE — 99213 OFFICE O/P EST LOW 20 MIN: CPT | Performed by: NURSE PRACTITIONER

## 2025-06-08 PROCEDURE — 1036F TOBACCO NON-USER: CPT | Performed by: NURSE PRACTITIONER

## 2025-06-08 RX ORDER — DOXYCYCLINE 100 MG/1
100 CAPSULE ORAL 2 TIMES DAILY
Qty: 20 CAPSULE | Refills: 0 | Status: SHIPPED | OUTPATIENT
Start: 2025-06-08 | End: 2025-06-18

## 2025-06-08 RX ORDER — FLUCONAZOLE 150 MG/1
150 TABLET ORAL ONCE
Qty: 1 TABLET | Refills: 0 | Status: SHIPPED | OUTPATIENT
Start: 2025-06-08 | End: 2025-06-08

## 2025-06-08 ASSESSMENT — LIFESTYLE VARIABLES: HISTORY_OF_SMOKING: I HAVE NEVER SMOKED

## 2025-06-08 NOTE — PROGRESS NOTES
Pat Dorado is a 35 y.o. female who presents virtually today for an acute sick visit    I performed this visit using real-time telehealth tools, including an audio/video connection between Pat Dorado  and myself, Charley Wood CNP,  within the state of [place].  Consent has been obtained for this visit.  I have verbally confirmed with Pat Dorado (or parent if under 18) that they are physically located in the [facility] during this virtual visit.  Telemedicine appropriate evaluation completed.  Unable to perform complete physical exam due to virtual visit.      Chief Complaint   Patient presents with    Sinus Problem       Symptoms: Nasal congestion, sore throat, head congestion, facial pain/pressure     Symptom onset has been acute for a time period of 2 week(s).     Severity is described as moderate.     Course of her symptoms over time is worsening.    Has taken: Zyrtec, Flonase, Tylenol and Motrin      RX Allergies[1]    Review of Systems  ROS was completed and all systems are negative with the exception of what was noted in the the HPI.       Objective   Vitals:  There were no vitals taken for this visit.      Current Outpatient Medications   Medication Instructions    amphetamine-dextroamphetamine XR (Adderall XR) 20 mg 24 hr capsule 20 mg, Daily    busPIRone (Buspar) 10 mg tablet 1 tablet, 3 times daily (0900,1400,1900)    cetirizine (ZYRTEC) 10 mg, oral, Daily RT    doxycycline (VIBRAMYCIN) 100 mg, oral, 2 times daily, Take with at least 8 ounces (large glass) of water, do not lie down for 30 minutes after    fluconazole (DIFLUCAN) 150 mg, oral, Once    levonorgestrel (Mirena) 21 mcg/24 hours (8 yrs) 52 mg IUD 1 each    Linzess 290 mcg, Daily RT    pantoprazole (PROTONIX) 40 mg, oral, 2 times daily before meals    polyethylene glycol (Glycolax, Miralax) 17 gram/dose powder MIX 17 GRAMS IN LIQUID AND DRINK TWICE DAILY    rizatriptan MLT (MAXALT-MLT) 10 mg, oral, Once as needed, May repeat in 2 hours if  unresolved. Do not exceed 30 mg in 24 hours.    senna 8.6 mg tablet 2 tablets, Nightly    sertraline (Zoloft) 50 mg tablet 1.5 tablets, Daily (0630)         Physical Exam  Pulmonary:      Effort: Pulmonary effort is normal.   Neurological:      Mental Status: She is alert and oriented to person, place, and time.   Psychiatric:         Mood and Affect: Mood normal.         Behavior: Behavior normal.         Thought Content: Thought content normal.       Assessment & Plan  Acute non-recurrent maxillary sinusitis  Rx Doxycycline (per pt request)   Advil cold/sinus for headache/congestion  Drink plenty of fluids  Wash hands frequently   Nasal saline as needed for nasal congestion   May try Flonase for increased nasal swelling.  Instill 2 squirts each nostril once daily x 2 weeks.    If you have good blood pressure, you can try Sudafed for 3-5 days     Orders:    doxycycline (Vibramycin) 100 mg capsule; Take 1 capsule (100 mg) by mouth 2 times a day for 10 days. Take with at least 8 ounces (large glass) of water, do not lie down for 30 minutes after    Antibiotic-induced yeast infection  Diflucan x 1 dose per pt request   Orders:    fluconazole (Diflucan) 150 mg tablet; Take 1 tablet (150 mg) by mouth 1 time for 1 dose.     I spent a total of 25 minutes on the date of the service which included preparing to see the patient, face-to-face patient care, completing clinical documentation, obtaining and/or reviewing separately obtained history, performing a medically appropriate examination, counseling and educating the patient/family/caregiver, ordering medications and/or tests, communicating with other HCPs (not separately reported), communicating results to the patient/family/caregiver and care coordination (not separately reported).     Assessment, impression and plan is reflected in the note above as well as the orders.     Plan was discussed with the patient and patient signalled understanding of the plan.          [1]    Allergies  Allergen Reactions    Cefaclor Angioedema, Other, Swelling and Anaphylaxis     Swollen joints

## 2025-06-08 NOTE — ASSESSMENT & PLAN NOTE
Rx Doxycycline (per pt request)   Advil cold/sinus for headache/congestion  Drink plenty of fluids  Wash hands frequently   Nasal saline as needed for nasal congestion   May try Flonase for increased nasal swelling.  Instill 2 squirts each nostril once daily x 2 weeks.    If you have good blood pressure, you can try Sudafed for 3-5 days     Orders:    doxycycline (Vibramycin) 100 mg capsule; Take 1 capsule (100 mg) by mouth 2 times a day for 10 days. Take with at least 8 ounces (large glass) of water, do not lie down for 30 minutes after

## 2025-06-08 NOTE — PATIENT INSTRUCTIONS
Acute non-recurrent maxillary sinusitis  #SINUSITIS  Rx Doxycycline (per pt request)   Advil cold/sinus for headache/congestion  Drink plenty of fluids  Wash hands frequently   Nasal saline as needed for nasal congestion   May try Flonase for increased nasal swelling.  Instill 2 squirts each nostril once daily x 2 weeks.    If you have good blood pressure, you can try Sudafed for 3-5 days     Orders:    doxycycline (Vibramycin) 100 mg capsule; Take 1 capsule (100 mg) by mouth 2 times a day for 10 days. Take with at least 8 ounces (large glass) of water, do not lie down for 30 minutes after    Antibiotic-induced yeast infection  Diflucan x 1 dose per pt request   Orders:    fluconazole (Diflucan) 150 mg tablet; Take 1 tablet (150 mg) by mouth 1 time for 1 dose.    FU WITH PCP AS NEEDED

## 2025-07-05 ENCOUNTER — TELEMEDICINE (OUTPATIENT)
Dept: PRIMARY CARE | Facility: CLINIC | Age: 35
End: 2025-07-05
Payer: COMMERCIAL

## 2025-07-05 DIAGNOSIS — B37.31 VAGINAL YEAST INFECTION: ICD-10-CM

## 2025-07-05 DIAGNOSIS — J01.90 ACUTE BACTERIAL SINUSITIS: Primary | ICD-10-CM

## 2025-07-05 DIAGNOSIS — B96.89 ACUTE BACTERIAL SINUSITIS: Primary | ICD-10-CM

## 2025-07-05 PROCEDURE — 1036F TOBACCO NON-USER: CPT | Performed by: FAMILY MEDICINE

## 2025-07-05 PROCEDURE — 99213 OFFICE O/P EST LOW 20 MIN: CPT | Performed by: FAMILY MEDICINE

## 2025-07-05 RX ORDER — FLUCONAZOLE 150 MG/1
150 TABLET ORAL ONCE
Qty: 1 TABLET | Refills: 0 | Status: SHIPPED | OUTPATIENT
Start: 2025-07-05 | End: 2025-07-05

## 2025-07-05 RX ORDER — DOXYCYCLINE 100 MG/1
100 CAPSULE ORAL 2 TIMES DAILY
Qty: 20 CAPSULE | Refills: 0 | Status: SHIPPED | OUTPATIENT
Start: 2025-07-05 | End: 2025-07-15

## 2025-07-05 ASSESSMENT — ENCOUNTER SYMPTOMS
EYE ITCHING: 0
SHORTNESS OF BREATH: 0
CHILLS: 0
PSYCHIATRIC NEGATIVE: 1
COUGH: 1
DIARRHEA: 0
WHEEZING: 0
EYE REDNESS: 0
RHINORRHEA: 1
EYE PAIN: 0
APPETITE CHANGE: 0
SINUS PRESSURE: 1
FATIGUE: 1
SORE THROAT: 1
ACTIVITY CHANGE: 0
NAUSEA: 0
SINUS PAIN: 1
VOICE CHANGE: 0
MYALGIAS: 0
HEADACHES: 1
VOMITING: 0
CONSTIPATION: 0
FEVER: 0

## 2025-07-05 ASSESSMENT — LIFESTYLE VARIABLES: HISTORY_OF_SMOKING: I HAVE NEVER SMOKED

## 2025-07-05 NOTE — PROGRESS NOTES
Subjective   Patient ID: Pat Dorado is a 35 y.o. female who presents for Sinus congestion.     An interactive audio and video telecommunication system which permits real time communications between the patient (at the originating site) and provider (at the distant site) was utilized to provide this telehealth service. At the start of the visit, I introduced myself as the nurse practitioner for their visit today, Danitza GRAY. I then verified the patients name, , and current physical location. Patient confirmed their current location was IN THE [facility]. If they were currently outside of the [facility] [place], the call was terminated and the patient was directed to alternative means of care. The patient was made aware of the limitations of the telehealth visit. They will not be physically examined and all issues may not be appropriate for a telehealth visit. If at any time this provider felt the visit was not appropriate for a video visit or more advanced care was necessary, the call would be terminated and the patient would be advised to seek proper, in person, medical attention. Pt verbalizes understanding and agrees to continue with televisit.     Pt presents with c/o sinus congestion, facial pain, HA x5 days. Pt has chronic sinusitis. Sinus congestion started a week ago. Started a left over steroid tapered. Yellow nasal mucus. Post nasal drip. Low grade fever. Sore throat. 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     URI   This is a new problem. The current episode started in the past 7 days. Associated symptoms include congestion, coughing, headaches, a plugged ear sensation, rhinorrhea, sinus pain and a sore throat. Pertinent negatives include no chest pain, diarrhea, ear pain, nausea, sneezing, vomiting or wheezing.        Review of Systems   Constitutional:  Positive for fatigue. Negative for activity change, appetite change, chills and fever.   HENT:  Positive for congestion,  postnasal drip, rhinorrhea, sinus pressure, sinus pain and sore throat. Negative for ear discharge, ear pain, sneezing and voice change.    Eyes:  Negative for pain, redness and itching.   Respiratory:  Positive for cough. Negative for shortness of breath and wheezing.    Cardiovascular:  Negative for chest pain.   Gastrointestinal:  Negative for constipation, diarrhea, nausea and vomiting.   Genitourinary:  Negative for decreased urine volume.   Musculoskeletal:  Negative for myalgias.   Neurological:  Positive for headaches.   Psychiatric/Behavioral: Negative.         Objective   There were no vitals taken for this visit.    Physical Exam  Vitals and nursing note reviewed.   Constitutional:       General: She is not in acute distress.  Eyes:      Conjunctiva/sclera: Conjunctivae normal.   Neurological:      General: No focal deficit present.      Mental Status: She is alert. Mental status is at baseline.   Psychiatric:         Mood and Affect: Mood normal.         Behavior: Behavior normal.         Thought Content: Thought content normal.         Judgment: Judgment normal.       Physical exam limited d/t video visit.   Assessment/Plan   Diagnoses and all orders for this visit:  Acute bacterial sinusitis  -     doxycycline (Vibramycin) 100 mg capsule; Take 1 capsule (100 mg) by mouth 2 times a day for 10 days. Take with at least 8 ounces (large glass) of water, do not lie down for 30 minutes after  Vaginal yeast infection  -     fluconazole (Diflucan) 150 mg tablet; Take 1 tablet (150 mg) by mouth 1 time for 1 dose.    Start antibiotics as directed and finish full course.  Reviewed risks, side effects, and expected treatment course.  May use OTC tylenol/ibuprofen, nasal or oral decongestants for symptom control as discussed.   May use saline nasal spray as discussed to thin mucus and relieve nasal symptoms.  Call if not rapidly improving over the next 5-7 days.    -diflucan for frequent yeast infections with abx  use.    The total time spent on this visit was 20 min. This time includes, but is not limited to, reviewing the patient's history, labs, test results, allergies, current medications, interviewing, assessing, examining, diagnosing, counseling, education, placing orders, documenting and care coordination.

## 2025-07-14 ENCOUNTER — PROCEDURE VISIT (OUTPATIENT)
Dept: NEUROLOGY | Facility: CLINIC | Age: 35
End: 2025-07-14
Payer: COMMERCIAL

## 2025-07-14 DIAGNOSIS — G43.709 CHRONIC MIGRAINE W/O AURA W/O STATUS MIGRAINOSUS, NOT INTRACTABLE: Primary | ICD-10-CM

## 2025-07-14 PROCEDURE — 64615 CHEMODENERV MUSC MIGRAINE: CPT | Performed by: STUDENT IN AN ORGANIZED HEALTH CARE EDUCATION/TRAINING PROGRAM

## 2025-07-14 PROCEDURE — 2500000004 HC RX 250 GENERAL PHARMACY W/ HCPCS (ALT 636 FOR OP/ED): Performed by: STUDENT IN AN ORGANIZED HEALTH CARE EDUCATION/TRAINING PROGRAM

## 2025-07-14 RX ORDER — DOXYCYCLINE 100 MG/1
1 CAPSULE ORAL
COMMUNITY
Start: 2025-06-26

## 2025-07-14 RX ORDER — FLUCONAZOLE 150 MG/1
1 TABLET ORAL
COMMUNITY
Start: 2025-07-05

## 2025-07-14 RX ORDER — METHYLPREDNISOLONE 4 MG/1
TABLET ORAL
COMMUNITY
Start: 2025-05-03

## 2025-07-14 RX ORDER — DEXTROAMPHETAMINE SACCHARATE, AMPHETAMINE ASPARTATE, DEXTROAMPHETAMINE SULFATE AND AMPHETAMINE SULFATE 2.5; 2.5; 2.5; 2.5 MG/1; MG/1; MG/1; MG/1
1 TABLET ORAL
COMMUNITY
Start: 2025-07-09

## 2025-07-14 RX ADMIN — ONABOTULINUMTOXINA 185 UNITS: 100 INJECTION, POWDER, LYOPHILIZED, FOR SOLUTION INTRADERMAL; INTRAMUSCULAR at 11:57

## 2025-07-14 NOTE — PROGRESS NOTES
Subjective   Headaches increased to 15/30 HA days per month after last Botox. Notes headaches worsened with recent weather changes.     Botox    Date/Time: 7/14/2025 11:50 AM    Performed by: Sam Vernon DO  Authorized by: Sam Vernon DO    Procedure specific details:      Procedure Note: PREEMPT Botox    Indications: Chronic Migraine    Informed consent was obtained (explaining the procedure and risks and benefits of procedure) from patient: the signed consent form was placed in the medical record.  A time out was completed, verifying correct patient, procedure,site, positioning, and implants or special equipment.    200 units of OnabotulinumtoxinA were reconstituted with saline. Sites of injection were identified via PREEMPT protocol and cleaned with alcohol pads. Using a 30 gauge needle, patient received following injections:    5 units in procerus  5 units in each left and right   10 units divided between 2 sites of L frontalis  10 units divided between 2 sites of R frontalis  20 units divided between 4 sites of L temporalis  20 units divided between 4 sites of R temporalis  15 units divided between 3 sites of L occipitalis  15 units divided between 3 sites of R occipitalis  10 units divided between 2 sites of L paracervical muscles  10 units divided between 2 sites of R paracervical muscles  15 units divided between 3 sites of L trapezius  15 units divided between 3 sites of R trapezius    Additional Sites:  10u L occipitalis  10u R occipitalis  5u L masseter  5u R masseter    Used: 185u  Wasted: 15u    There were no complications. Patient was comfortable and left without complaint.     OnabotulinumtoxinA  Lot #: [id number]  Exp: 9/2027

## 2025-07-31 ENCOUNTER — TELEPHONE (OUTPATIENT)
Dept: PRIMARY CARE | Facility: CLINIC | Age: 35
End: 2025-07-31
Payer: COMMERCIAL